# Patient Record
Sex: FEMALE | Race: WHITE | NOT HISPANIC OR LATINO | Employment: OTHER | URBAN - METROPOLITAN AREA
[De-identification: names, ages, dates, MRNs, and addresses within clinical notes are randomized per-mention and may not be internally consistent; named-entity substitution may affect disease eponyms.]

---

## 2017-05-05 ENCOUNTER — TRANSCRIBE ORDERS (OUTPATIENT)
Dept: ADMINISTRATIVE | Facility: HOSPITAL | Age: 82
End: 2017-05-05

## 2017-05-05 DIAGNOSIS — Z12.31 VISIT FOR SCREENING MAMMOGRAM: Primary | ICD-10-CM

## 2017-05-17 ENCOUNTER — HOSPITAL ENCOUNTER (OUTPATIENT)
Dept: MAMMOGRAPHY | Facility: HOSPITAL | Age: 82
Discharge: HOME | End: 2017-05-17
Admitting: FAMILY MEDICINE

## 2017-05-17 DIAGNOSIS — Z12.31 VISIT FOR SCREENING MAMMOGRAM: ICD-10-CM

## 2017-05-17 PROCEDURE — G0202 SCR MAMMO BI INCL CAD: HCPCS

## 2017-05-17 PROCEDURE — 77063 BREAST TOMOSYNTHESIS BI: CPT | Performed by: RADIOLOGY

## 2017-05-17 PROCEDURE — 77063 BREAST TOMOSYNTHESIS BI: CPT

## 2017-05-17 PROCEDURE — G0202 SCR MAMMO BI INCL CAD: HCPCS | Performed by: RADIOLOGY

## 2017-06-14 ENCOUNTER — OFFICE VISIT (OUTPATIENT)
Dept: FAMILY MEDICINE CLINIC | Facility: CLINIC | Age: 82
End: 2017-06-14

## 2017-06-14 VITALS
OXYGEN SATURATION: 96 % | HEIGHT: 62 IN | TEMPERATURE: 97.7 F | SYSTOLIC BLOOD PRESSURE: 140 MMHG | BODY MASS INDEX: 25.03 KG/M2 | HEART RATE: 72 BPM | DIASTOLIC BLOOD PRESSURE: 78 MMHG | WEIGHT: 136 LBS

## 2017-06-14 DIAGNOSIS — I80.9 SUPERFICIAL PHLEBITIS: Primary | ICD-10-CM

## 2017-06-14 PROCEDURE — 99213 OFFICE O/P EST LOW 20 MIN: CPT | Performed by: FAMILY MEDICINE

## 2017-06-14 NOTE — PROGRESS NOTES
Subjective   Laverne Cole is a 83 y.o. female.     History of Present Illness   83-year-old female.  Complaining of pain in the right lower extremity.  That is the anterior aspect of the leg in the lower half.  There is been slightly red.  No definite fever.  No definite trauma.  Skin has not been broken.  Normally goes to Dr. Michael Cortes.  The following portions of the patient's history were reviewed and updated as appropriate: allergies, current medications, past family history, past medical history, past social history, past surgical history and problem list.    Review of Systems   Constitutional: Negative for fever and unexpected weight change.   Respiratory: Negative for cough and shortness of breath.    Cardiovascular: Negative for leg swelling.   Musculoskeletal:        Leg pain       Objective   Physical Exam   Constitutional: She appears well-nourished.   HENT:   Head: Normocephalic.   Neurological: She is alert.   Skin:            Assessment/Plan   Laverne was seen today for leg pain.    Diagnoses and all orders for this visit:    Superficial phlebitis    Elevate leg.  May take some Aleve twice a day short-term that is 1-2 weeks.  Don't think other testing needs to be done.  Nothing to suggest a DVT.

## 2018-04-05 ENCOUNTER — TRANSCRIBE ORDERS (OUTPATIENT)
Dept: ADMINISTRATIVE | Facility: HOSPITAL | Age: 83
End: 2018-04-05

## 2018-04-05 DIAGNOSIS — Z12.31 VISIT FOR SCREENING MAMMOGRAM: Primary | ICD-10-CM

## 2018-05-18 ENCOUNTER — HOSPITAL ENCOUNTER (OUTPATIENT)
Dept: MAMMOGRAPHY | Facility: HOSPITAL | Age: 83
Discharge: HOME | End: 2018-05-18
Admitting: FAMILY MEDICINE

## 2018-05-18 DIAGNOSIS — Z12.31 VISIT FOR SCREENING MAMMOGRAM: ICD-10-CM

## 2018-05-18 PROCEDURE — 77063 BREAST TOMOSYNTHESIS BI: CPT

## 2018-05-18 PROCEDURE — 77067 SCR MAMMO BI INCL CAD: CPT

## 2018-05-18 PROCEDURE — 77063 BREAST TOMOSYNTHESIS BI: CPT | Performed by: RADIOLOGY

## 2018-05-18 PROCEDURE — 77067 SCR MAMMO BI INCL CAD: CPT | Performed by: RADIOLOGY

## 2019-04-05 ENCOUNTER — TRANSCRIBE ORDERS (OUTPATIENT)
Dept: ADMINISTRATIVE | Facility: HOSPITAL | Age: 84
End: 2019-04-05

## 2019-04-05 DIAGNOSIS — Z12.31 VISIT FOR SCREENING MAMMOGRAM: Primary | ICD-10-CM

## 2019-05-21 ENCOUNTER — HOSPITAL ENCOUNTER (OUTPATIENT)
Dept: MAMMOGRAPHY | Facility: HOSPITAL | Age: 84
Discharge: HOME | End: 2019-05-21
Admitting: FAMILY MEDICINE

## 2019-05-21 DIAGNOSIS — Z12.31 VISIT FOR SCREENING MAMMOGRAM: ICD-10-CM

## 2019-05-21 PROCEDURE — 77067 SCR MAMMO BI INCL CAD: CPT

## 2019-05-21 PROCEDURE — 77063 BREAST TOMOSYNTHESIS BI: CPT | Performed by: RADIOLOGY

## 2019-05-21 PROCEDURE — 77067 SCR MAMMO BI INCL CAD: CPT | Performed by: RADIOLOGY

## 2019-05-21 PROCEDURE — 77063 BREAST TOMOSYNTHESIS BI: CPT

## 2020-04-10 ENCOUNTER — TRANSCRIBE ORDERS (OUTPATIENT)
Dept: ADMINISTRATIVE | Facility: HOSPITAL | Age: 85
End: 2020-04-10

## 2020-04-10 DIAGNOSIS — Z12.31 VISIT FOR SCREENING MAMMOGRAM: Primary | ICD-10-CM

## 2020-07-24 ENCOUNTER — HOSPITAL ENCOUNTER (OUTPATIENT)
Dept: MAMMOGRAPHY | Facility: HOSPITAL | Age: 85
Discharge: HOME | End: 2020-07-24
Admitting: FAMILY MEDICINE

## 2020-07-24 DIAGNOSIS — Z12.31 VISIT FOR SCREENING MAMMOGRAM: ICD-10-CM

## 2020-07-24 PROCEDURE — 77063 BREAST TOMOSYNTHESIS BI: CPT

## 2020-07-24 PROCEDURE — 77067 SCR MAMMO BI INCL CAD: CPT | Performed by: RADIOLOGY

## 2020-07-24 PROCEDURE — 77067 SCR MAMMO BI INCL CAD: CPT

## 2020-07-24 PROCEDURE — 77063 BREAST TOMOSYNTHESIS BI: CPT | Performed by: RADIOLOGY

## 2021-06-18 ENCOUNTER — TRANSCRIBE ORDERS (OUTPATIENT)
Dept: ADMINISTRATIVE | Facility: HOSPITAL | Age: 86
End: 2021-06-18

## 2021-06-18 DIAGNOSIS — Z12.31 VISIT FOR SCREENING MAMMOGRAM: Primary | ICD-10-CM

## 2021-08-02 ENCOUNTER — APPOINTMENT (OUTPATIENT)
Dept: MAMMOGRAPHY | Facility: HOSPITAL | Age: 86
End: 2021-08-02

## 2021-08-26 ENCOUNTER — HOSPITAL ENCOUNTER (OUTPATIENT)
Dept: MAMMOGRAPHY | Facility: HOSPITAL | Age: 86
Discharge: HOME | End: 2021-08-26
Admitting: FAMILY MEDICINE

## 2021-08-26 DIAGNOSIS — Z12.31 VISIT FOR SCREENING MAMMOGRAM: ICD-10-CM

## 2021-08-26 PROCEDURE — 77063 BREAST TOMOSYNTHESIS BI: CPT

## 2021-08-26 PROCEDURE — 77063 BREAST TOMOSYNTHESIS BI: CPT | Performed by: RADIOLOGY

## 2021-08-26 PROCEDURE — 77067 SCR MAMMO BI INCL CAD: CPT | Performed by: RADIOLOGY

## 2021-08-26 PROCEDURE — 77067 SCR MAMMO BI INCL CAD: CPT

## 2022-03-03 ENCOUNTER — OFFICE VISIT (OUTPATIENT)
Dept: ORTHOPEDIC SURGERY | Facility: CLINIC | Age: 87
End: 2022-03-03

## 2022-03-03 VITALS
BODY MASS INDEX: 31.47 KG/M2 | DIASTOLIC BLOOD PRESSURE: 64 MMHG | WEIGHT: 136 LBS | HEIGHT: 55 IN | SYSTOLIC BLOOD PRESSURE: 108 MMHG

## 2022-03-03 DIAGNOSIS — M12.812 ROTATOR CUFF TEAR ARTHROPATHY OF BOTH SHOULDERS: Primary | ICD-10-CM

## 2022-03-03 DIAGNOSIS — M75.101 ROTATOR CUFF TEAR ARTHROPATHY OF BOTH SHOULDERS: Primary | ICD-10-CM

## 2022-03-03 DIAGNOSIS — M25.511 CHRONIC PAIN OF BOTH SHOULDERS: ICD-10-CM

## 2022-03-03 DIAGNOSIS — M12.811 ROTATOR CUFF TEAR ARTHROPATHY OF BOTH SHOULDERS: Primary | ICD-10-CM

## 2022-03-03 DIAGNOSIS — M25.512 CHRONIC PAIN OF BOTH SHOULDERS: ICD-10-CM

## 2022-03-03 DIAGNOSIS — M75.102 ROTATOR CUFF TEAR ARTHROPATHY OF BOTH SHOULDERS: Primary | ICD-10-CM

## 2022-03-03 DIAGNOSIS — M75.122 NONTRAUMATIC COMPLETE TEAR OF LEFT ROTATOR CUFF: ICD-10-CM

## 2022-03-03 DIAGNOSIS — M75.121 NONTRAUMATIC COMPLETE TEAR OF RIGHT ROTATOR CUFF: ICD-10-CM

## 2022-03-03 DIAGNOSIS — G89.29 CHRONIC PAIN OF BOTH SHOULDERS: ICD-10-CM

## 2022-03-03 PROCEDURE — 99204 OFFICE O/P NEW MOD 45 MIN: CPT | Performed by: ORTHOPAEDIC SURGERY

## 2022-03-03 RX ORDER — BLOOD SUGAR DIAGNOSTIC
STRIP MISCELLANEOUS
COMMUNITY
Start: 2022-01-15

## 2022-03-03 RX ORDER — GLYBURIDE 2.5 MG/1
TABLET ORAL
COMMUNITY

## 2022-03-03 RX ORDER — ATORVASTATIN CALCIUM 10 MG/1
TABLET, FILM COATED ORAL
COMMUNITY
End: 2022-09-01

## 2022-03-03 RX ORDER — MONTELUKAST SODIUM 10 MG/1
10 TABLET ORAL DAILY PRN
COMMUNITY
Start: 2021-12-01

## 2022-03-03 RX ORDER — MONTELUKAST SODIUM 10 MG/1
TABLET ORAL
COMMUNITY
End: 2022-03-03

## 2022-03-03 RX ORDER — LOSARTAN POTASSIUM 100 MG/1
100 TABLET ORAL NIGHTLY
COMMUNITY
Start: 2021-12-01

## 2022-03-03 RX ORDER — PANTOPRAZOLE SODIUM 40 MG/1
40 TABLET, DELAYED RELEASE ORAL DAILY
COMMUNITY
Start: 2022-01-11

## 2022-03-03 RX ORDER — DICYCLOMINE HYDROCHLORIDE 10 MG/1
CAPSULE ORAL
COMMUNITY

## 2022-03-03 RX ORDER — METOPROLOL SUCCINATE 25 MG/1
25 TABLET, EXTENDED RELEASE ORAL DAILY
COMMUNITY

## 2022-03-03 RX ORDER — CLOPIDOGREL BISULFATE 75 MG/1
TABLET ORAL DAILY
COMMUNITY
End: 2022-03-03

## 2022-03-03 RX ORDER — SODIUM POLYSTYRENE SULFONATE 4.1 MEQ/G
POWDER, FOR SUSPENSION ORAL; RECTAL
COMMUNITY
Start: 2022-02-04

## 2022-03-03 RX ORDER — CLOPIDOGREL BISULFATE 75 MG/1
75 TABLET ORAL DAILY
COMMUNITY
Start: 2022-01-22

## 2022-03-03 RX ORDER — LANCETS
EACH MISCELLANEOUS
COMMUNITY
Start: 2021-12-24

## 2022-03-03 NOTE — PROGRESS NOTES
[facility] Office Visit - Ron Lomax MD    Office Visit       Patient Name: Laverne Cole    Chief Complaint:   Chief Complaint   Patient presents with   • Right Shoulder - Pain   • Left Shoulder - Pain       Referring Physician: Zacarias Vaz MD -- I appreciate the referral    History of Present Illness:   Laverne Cole is a 88 y.o. female who presents with bilateral body part: shoulder Reason: pain.  Onset:Onset: atraumatic and gradual in nature. The issue has been ongoing for 8 year(s). Pain is a 5/10 on the pain scale. Pain is described as Pain Characterization: aching. Associated symptoms include Symptoms: pain, swelling, popping, grinding and stiffness. The pain is worse with lying on affected side; resting, ice and pain medication and/or NSAID improve the pain. Previous treatments have included: cane/walker and oral steroids. I have reviewed the patient's history of present illness as noted/entered above.    I have reviewed the patient's past medical history, surgical history, social history, family history, medications, and allergies as noted in the electronic medical record and as noted/entered.  I have reviewed the patient's review of systems as noted/enter and updated as noted in the patient's HPI.      Reviewed note from Dr. Vaz. 80ml aspirated from left shoulder and smaller amount from right  Shoulder pain RIGHT November in [place] in shower  Repeat event on the LEFT this past week in shower as well; pain medication helped    Severe bilateral rotator cuff tear arthropathy  Left sided Defibrillator    Patient and daughter acknowledge that not good surgical candidate based on medical comorbidities    Aspirations and injections in the past    [employer] worker    Very supportive daughter present    Niece: Reyna Thornton          Subjective   Subjective      Review of Systems   Constitutional: Negative.     HENT: Negative.    Eyes: Negative.    Respiratory: Negative.    Cardiovascular: Positive for palpitations.   Gastrointestinal: Negative.    Endocrine: Positive for heat intolerance.   Genitourinary: Negative.    Musculoskeletal: Positive for arthralgias, joint swelling and neck stiffness.   Skin: Negative.    Allergic/Immunologic: Negative.    Neurological: Negative.    Hematological: Negative.    Psychiatric/Behavioral: Negative.         Past Medical History:   Past Medical History:   Diagnosis Date   • Corns and callosities    • Cough    • Diabetes mellitus (HCC)    • Foot pain    • Hyperlipidemia    • Hypertension    • Low back pain    • Osteoporosis    • Peripheral venous insufficiency    • Pneumonia    • Toe pain        Past Surgical History:   Past Surgical History:   Procedure Laterality Date   • BACK SURGERY  05/2013    REDDING   • PACEMAKER IMPLANTATION     • TUBAL ABDOMINAL LIGATION         Family History:   Family History   Problem Relation Age of Onset   • Breast cancer Daughter 43   • Breast cancer Maternal Aunt         DX AGE UNKNOWN   • Breast cancer Maternal Aunt         DX AGE UNKNOWN   • Ovarian cancer Neg Hx        Social History:   Social History     Socioeconomic History   • Marital status:    Tobacco Use   • Smoking status: Never Smoker   Substance and Sexual Activity   • Alcohol use: No   • Drug use: No       Medications:   Current Outpatient Medications:   •  aspirin 81 MG EC tablet, Take 81 mg by mouth Daily., Disp: , Rfl:   •  atorvastatin (Lipitor) 10 MG tablet, Lipitor 10 mg tablet  Every night at bedtime, Disp: , Rfl:   •  Cholecalciferol (VITAMIN D3) 5000 UNITS capsule capsule, Take 5,000 Units by mouth Daily., Disp: , Rfl:   •  clopidogrel (PLAVIX) 75 MG tablet, Take 75 mg by mouth Daily., Disp: , Rfl:   •  cyclobenzaprine (FLEXERIL) 10 MG tablet, Take 10 mg by mouth 3 (Three) Times a Day As Needed for muscle spasms., Disp: , Rfl:   •  dicyclomine (Bentyl) 10 MG capsule, Bentyl  "10 mg capsule  Four times a day, Disp: , Rfl:   •  furosemide (LASIX) 20 MG tablet, Take 20 mg by mouth Daily., Disp: , Rfl:   •  glyburide (DIAbeta) 2.5 MG tablet, glyburide 2.5 mg tablet  Daily, Disp: , Rfl:   •  Lancets (onetouch ultrasoft) lancets, USE 1 CARTRIDGE NEEDLE ONCE A DAY, Disp: , Rfl:   •  losartan (COZAAR) 100 MG tablet, Take 100 mg by mouth Every Night., Disp: , Rfl:   •  metoprolol succinate XL (TOPROL-XL) 25 MG 24 hr tablet, Take 25 mg by mouth Daily., Disp: , Rfl:   •  montelukast (SINGULAIR) 10 MG tablet, Take 10 mg by mouth Daily As Needed., Disp: , Rfl:   •  OneTouch Ultra test strip, USE 1 STRIP ONCE A DAY, Disp: , Rfl:   •  pantoprazole (PROTONIX) 40 MG EC tablet, Take 40 mg by mouth Daily., Disp: , Rfl:   •  sAXagliptin HCl (ONGLYZA PO), Onglyza, Disp: , Rfl:   •  sodium polystyrene (KAYEXALATE) powder, , Disp: , Rfl:     Allergies: No Known Allergies    The following portions of the patient's history were reviewed and updated as appropriate: allergies, current medications, past family history, past medical history, past social history, past surgical history and problem list.        Objective    Objective      Vital Signs:   Vitals:    03/03/22 0953   BP: 108/64   Weight: 61.7 kg (136 lb)   Height: 62 cm (24.41\")       Ortho Exam:  General: no acute distress, comfortable  Vitals reviewed in chart    Musculoskeletal Exam    SIDE: RIGHT worse than left shoulder  Shoulder Exam    Tenderness:  GH joint pain  Known acromial insufficiency fractures    Range of motion measurements (degrees)  Forward flexion/Abduction/External rotation at side/ER at 90/IR at 90/IR position  Active: 45/45/10 bilateral  Severe crepitus  Passive: same    Painful arc of motion: yes  Glenohumeral joint contracture: yes  Glenohumeral joint crepitus: yes  Glenohumeral joint pain: yes  No evidence of septic joint  Impingement testing Neer's test - positive/painful  Impingement testing Hawkin's test - " positive/painful    Rotator Cuff Testing:  Tenderness to palpation at rotator cuff - yes  Rotator cuff testing Car's test - positive  Rotator cuff testing External rotation - positive  Rotator cuff testing Lag signs - positive  Rotator cuff testing Belly press - positive  Pain with abduction great than 90 degrees - yes  Rotator cuff testing limiting by glenohumeral joint pain and stiffness      Results Review:   Imaging Results (Last 24 Hours)     Procedure Component Value Units Date/Time    XR Shoulder 2+ View Bilateral [[id number]] Resulted: 03/03/22 1028     Updated: 03/03/22 1032    Narrative:      Imaging: BILATERAL shoulder x-rays 2 views - AP and axillary x-ray views    Side: BILATERAL     Indication for BILATERAL  shoulder x-ray 2 views: BILATERAL shoulder pain    Comparison: no comparison views available    BILATERAL SHOULDER Findings:   Bilateral severe rotator cuff tear arthropathy right worse than left.    Bilateral acromial insufficiency fractures.  Right glenoid severe wear and   medialization radiographic findings.    I personally reviewed the above x-rays and discussed with the patient.          Procedures           Assessment / Plan      Assessment/Plan:   Problem List Items Addressed This Visit        Musculoskeletal and Injuries    Rotator cuff tear arthropathy of both shoulders - Primary    Relevant Orders    Ambulatory Referral to Physical Therapy    Nontraumatic complete tear of right rotator cuff    Relevant Orders    Ambulatory Referral to Physical Therapy    Chronic pain of both shoulders    Relevant Orders    XR Shoulder 2+ View Bilateral (Completed)    Ambulatory Referral to Physical Therapy    Nontraumatic complete tear of left rotator cuff    Relevant Orders    Ambulatory Referral to Physical Therapy        Severe bilateral rotator cuff tear arthropathy  RIGHT worse than left radiographically  Counseled on conservative course she has an excellent protocol from Dr. Vaz with regards to  rotator cuff and deltoid programs.  She was interested in doing formal therapy so this was prescribed.  They know of Linh Cosme and we will get her plugged in-excellent physical therapist    I agree with conservative course in this complex scenario.  Severe rotator cuff tear arthropathy bilaterally right significantly worse than left.  The right does not look reconstructive on the glenoid side and history of defibrillator/not interested in pursuing surgical intervention which I think is very reasonable to stay with conservative course.    Follow Up: 6 months        Ron Lomax MD, FAAOS  Orthopedic Surgeon  Fellowship Trained Shoulder and Elbow Surgeon  [facility]  [address] [place] [address], [address]  [place] [address]    03/03/22  10:34 EST

## 2022-03-14 ENCOUNTER — TREATMENT (OUTPATIENT)
Dept: PHYSICAL THERAPY | Facility: CLINIC | Age: 87
End: 2022-03-14

## 2022-03-14 DIAGNOSIS — M12.811 ROTATOR CUFF TEAR ARTHROPATHY OF BOTH SHOULDERS: ICD-10-CM

## 2022-03-14 DIAGNOSIS — G89.29 CHRONIC PAIN OF BOTH SHOULDERS: Primary | ICD-10-CM

## 2022-03-14 DIAGNOSIS — M75.101 ROTATOR CUFF TEAR ARTHROPATHY OF BOTH SHOULDERS: ICD-10-CM

## 2022-03-14 DIAGNOSIS — M75.102 ROTATOR CUFF TEAR ARTHROPATHY OF BOTH SHOULDERS: ICD-10-CM

## 2022-03-14 DIAGNOSIS — M75.122 NONTRAUMATIC COMPLETE TEAR OF LEFT ROTATOR CUFF: ICD-10-CM

## 2022-03-14 DIAGNOSIS — M12.812 ROTATOR CUFF TEAR ARTHROPATHY OF BOTH SHOULDERS: ICD-10-CM

## 2022-03-14 DIAGNOSIS — M25.512 CHRONIC PAIN OF BOTH SHOULDERS: Primary | ICD-10-CM

## 2022-03-14 DIAGNOSIS — M25.511 CHRONIC PAIN OF BOTH SHOULDERS: Primary | ICD-10-CM

## 2022-03-14 DIAGNOSIS — M75.121 NONTRAUMATIC COMPLETE TEAR OF ROTATOR CUFF, RIGHT: ICD-10-CM

## 2022-03-14 PROCEDURE — 97162 PT EVAL MOD COMPLEX 30 MIN: CPT | Performed by: PHYSICAL THERAPIST

## 2022-03-14 PROCEDURE — 97110 THERAPEUTIC EXERCISES: CPT | Performed by: PHYSICAL THERAPIST

## 2022-03-14 NOTE — PROGRESS NOTES
Physical Therapy Initial Evaluation and Plan of Care    Patient: Laverne Cole   : 1933  Diagnosis/ICD-10 Code:  Chronic pain of both shoulders [M25.511, G89.29, M25.512]  Referring practitioner: Ron Lomax MD  Date of Initial Visit: 3/14/2022  Today's Date: 3/15/2022  Patient seen for 1 sessions         Visit Diagnoses:    ICD-10-CM ICD-9-CM   1. Chronic pain of both shoulders  M25.511 719.41    G89.29 338.29    M25.512    2. Rotator cuff tear arthropathy of both shoulders  M75.101 716.81    M12.811     M12.812     M75.102    3. Nontraumatic complete tear of rotator cuff, right  M75.121 727.61   4. Nontraumatic complete tear of left rotator cuff  M75.122 727.61       Subjective Questionnaire: QuickDASH: 50      Subjective Evaluation    History of Present Illness  Mechanism of injury: Pt states her right shoulder pain started in 2021, reports first episode of actual shoulder pain was when she was in the shower. States she noted significant pain that remained elevated, went to ED in [place] and got pain medication, which helped. States she f/u with Dr. Vaz when she returned from [place] in Dec, she was a previous Dr. Vaz patient in 2016 where she received injection and aspiration, f/u with him in [facility]. States she noted improvement with steroid pack and things got back to normal. States right shoulder pain repeated again recently. Almost went to the ER due to pain, states she noted increased pain with ice and heat both, improved with pain medication. States she was referred to Dr. oLmax, where she discussed option of PT, not a surgical candidate. States she has defibrillator on left side, recent heart attack 2021 with stent placed, cardiac rehab completed. R shoulder is worse than L shoulder, notes severe bilateral RC arthropathy. States she is unable to do her hair, requires assist from her daughter, sometimes has difficulty feeding herself with right arm due to limited motion and  shoulder pain. Pain and difficulty with reaching with both arms.     Subjective comment: Patient presents with c/o bilateral shoulder pain and limited shoulder mobility.   Patient Occupation: Retired, has supportive daughter present during evaluation, Alyson, with whom she lives. , spouse requires some assistance, indep with showering washing herself, needs help with wash hair. Uses a QB (leg bike) at home for about 30 minutes. SPC in RUE.  Pain  Current pain rating: 3  At best pain ratin  At worst pain ratin  Quality: dull ache, discomfort and sharp  Relieving factors: change in position  Aggravating factors: overhead activity, prolonged positioning, lifting, movement and repetitive movement  Progression: no change    Social Support  Lives in: multiple-level home  Lives with: spouse and adult children    Hand dominance: right    Diagnostic Tests  X-ray: abnormal    Treatments  Previous treatment: injection treatment  Patient Goals  Patient goals for therapy: decreased pain, increased motion, increased strength and independence with ADLs/IADLs             Objective          Postural Observations  Seated posture: fair    Additional Postural Observation Details  Increased thoracic kyphosis, rounded shoulders.     Tenderness     Additional Tenderness Details  Denies TTP along bilateral shoulders.     Cervical/Thoracic Screen   Cervical range of motion within normal limits with the following exceptions: Denies reproduction of pain with cervical mobility. Taut cervical rotation bilateral, denies pain.   Thoracic range of motion within normal limits with the following exceptions: Limited thoracic extension; maintains mild forward flexed posture.    Neurological Testing     Sensation     Shoulder   Left Shoulder   Intact: light touch    Right Shoulder   Intact: light touch    Active Range of Motion   Left Shoulder   Flexion: 58 degrees with pain  Abduction: 29 degrees with pain  External rotation 0°: 25  degrees     Right Shoulder   Flexion: 20 degrees with pain  Abduction: 20 degrees with pain  External rotation 0°: 12 degrees     Additional Active Range of Motion Details  Unable to perform ER behind head, lack of motion/strength  IR bilateral attempted, able to touch iliac crest laterally    Passive Range of Motion   Left Shoulder   Flexion: 90 degrees   Abduction: 90 degrees   External rotation 0°: 30 degrees     Right Shoulder   Flexion: 90 degrees   Abduction: 80 degrees   External rotation 0°: 30 degrees     Strength/Myotome Testing     Left Shoulder     Planes of Motion   Flexion: 3-   Abduction: 3-   External rotation at 0°: 3-   Internal rotation at 0°: 3+     Isolated Muscles   Biceps: 4-   Upper trapezius: 4     Right Shoulder     Planes of Motion   Flexion: 3-   Abduction: 3-   External rotation at 0°: 3-   Internal rotation at 0°: 3     Isolated Muscles   Biceps: 4-   Upper trapezius: 4     Tests     Additional Tests Details  Compensated UE elevation- significant [place] hiking            Assessment & Plan     Assessment  Impairments: abnormal or restricted ROM, activity intolerance, impaired physical strength, lacks appropriate home exercise program and pain with function  Functional Limitations: carrying objects, lifting, pulling, pushing, uncomfortable because of pain, moving in bed, reaching behind back, reaching overhead and unable to perform repetitive tasks  Assessment details: Pt is an 89 yo female referred to PT for chronic bilateral shoulder pain who demonstrates signs and symptoms consistent with advanced RC arthropathy. Pt recently had consult with Dr. Lomax, referred to PT for bilateral nontraumatic complete tear of RC. She demonstrates significant shoulder AROM limitation, weakness of BUE, poor scapular retraction, and reports decline from her functional baseline, requiring assistance with ADLs. R shoulder is worse than L shoulder, pain kidd. Recommend skilled PT to improve shoulder mobility,  maintain elbow and wrist mobility and strength to increase activity tolerance and improve independence.     Barriers to therapy: advanced RC arthropathy  Prognosis: fair    Goals  Plan Goals: Short Term Goals (3-4 wks)  1. Patient to report bilateral shoulder pain less than or equal to 4/10.  2. Patient to demonstrate at least 100 degrees of AROM right shoulder flexion.  3. Patient will be independent and compliant with initial home exercise program.   4. Pt will report resting pain 0-1/10, 2-3/10 with PROM.  5. Pt will demonstrate 10-30° improvements in PFE, PER, PIR, and PABD.    Long Term Goals (6-8 wks)  1. Patient to demonstrate at least 110 degrees of bilateral shoulder flexion AROM.  2. Patient to demonstrate at least 100 degrees of bilateral shoulder abduction AROM.  3. Pt. will be independent and compliant with advanced home exercise program to facilitate self-management of symptoms.  4. Patient will improve Quick Dash score by 15 points to demonstrate improved function of daily tasks.      Plan  Therapy options: will be seen for skilled therapy services  Planned modality interventions: cryotherapy, dry needling and thermotherapy (hydrocollator packs)  Planned therapy interventions: body mechanics training, functional ROM exercises, home exercise program, joint mobilization, manual therapy, neuromuscular re-education, postural training, soft tissue mobilization, strengthening, stretching and therapeutic activities  Frequency: 1x week  Duration in visits: 6  Duration in weeks: 8  Treatment plan discussed with: patient and caregiver  Plan details: Assess response from initial HEP. Progress mobility exercises for bilateral shoulders, scapular stabilization, elbow ROM, hand ROM and gripping exercises to improve functional independence with ADLs.         History # of Personal factors and/or comorbidities: MODERATE (1-2)  Examination of Body System(s): # of elements: MODERATE (3)  Clinical Presentation:  EVOLVING  Clinical Decision Making: MODERATE      Timed:  Manual Therapy:    0     mins  36507;  Therapeutic Exercise:    12     mins  44985;     Neuromuscular Lata:    0    mins  91127;    Therapeutic Activity:     0     mins  64533;     Gait Trainin     mins  92639;     Ultrasound:     0     mins  83924;    Ionto   __0_  mins  41405;    Un-Timed:  Electrical Stimulation:    0     mins  07764 ( );  Dry Needling     0     mins self-pay  Traction  _ 0_   mins  15983  Low Eval  __0_ mins  39949  Mod Eval  _42__ mins  62164  High Eval  _0__ mins   38632    Timed Treatment:   12   mins   Total Treatment:     54   mins    PT SIGNATURE: Corinne E. Perkins, PT   [place] License: [id number]      Certification Period: 3/15/2022 thru 2022  I certify that the therapy services are furnished while this patient is under my care.  The services outlined above are required by this patient, and will be reviewed every 90 days.        Physician Signature: _______________________________________________________________________________________________________     PHYSICIAN: Ron Lomax MD   NPI: [id number]     DATE:                                             Please sign and return via fax to .[employer] . Thank you, [facility].

## 2022-03-23 ENCOUNTER — TREATMENT (OUTPATIENT)
Dept: PHYSICAL THERAPY | Facility: CLINIC | Age: 87
End: 2022-03-23

## 2022-03-23 DIAGNOSIS — M25.512 CHRONIC PAIN OF BOTH SHOULDERS: Primary | ICD-10-CM

## 2022-03-23 DIAGNOSIS — M12.811 ROTATOR CUFF TEAR ARTHROPATHY OF BOTH SHOULDERS: ICD-10-CM

## 2022-03-23 DIAGNOSIS — M12.812 ROTATOR CUFF TEAR ARTHROPATHY OF BOTH SHOULDERS: ICD-10-CM

## 2022-03-23 DIAGNOSIS — M75.102 ROTATOR CUFF TEAR ARTHROPATHY OF BOTH SHOULDERS: ICD-10-CM

## 2022-03-23 DIAGNOSIS — M75.121 NONTRAUMATIC COMPLETE TEAR OF ROTATOR CUFF, RIGHT: ICD-10-CM

## 2022-03-23 DIAGNOSIS — G89.29 CHRONIC PAIN OF BOTH SHOULDERS: Primary | ICD-10-CM

## 2022-03-23 DIAGNOSIS — M25.511 CHRONIC PAIN OF BOTH SHOULDERS: Primary | ICD-10-CM

## 2022-03-23 DIAGNOSIS — M75.122 NONTRAUMATIC COMPLETE TEAR OF LEFT ROTATOR CUFF: ICD-10-CM

## 2022-03-23 DIAGNOSIS — M75.101 ROTATOR CUFF TEAR ARTHROPATHY OF BOTH SHOULDERS: ICD-10-CM

## 2022-03-23 PROCEDURE — 97110 THERAPEUTIC EXERCISES: CPT | Performed by: PHYSICAL THERAPIST

## 2022-03-23 PROCEDURE — 97530 THERAPEUTIC ACTIVITIES: CPT | Performed by: PHYSICAL THERAPIST

## 2022-03-23 NOTE — PROGRESS NOTES
"Physical Therapy Daily Treatment Note      Patient: Laverne Cole   : 1933  Referring practitioner: Ron Lomax MD  Date of Initial Visit: Type: THERAPY  Noted: 3/14/2022  Today's Date: 3/23/2022  Patient seen for 2 sessions       Visit Diagnoses:    ICD-10-CM ICD-9-CM   1. Chronic pain of both shoulders  M25.511 719.41    G89.29 338.29    M25.512    2. Rotator cuff tear arthropathy of both shoulders  M75.101 716.81    M12.811     M12.812     M75.102    3. Nontraumatic complete tear of rotator cuff, right  M75.121 727.61   4. Nontraumatic complete tear of left rotator cuff  M75.122 727.61       Subjective   Patient reports she has been doing her shoulder stretches and feels good on arrival. States she did fall off a chair forward, required assistance x 2 to stand. States cut her right elbow, presents with dressing over elbow. States  her pain level is 0/10 upon arrival.      Objective   See Exercise, Manual, and Modality Logs for complete treatment.       Assessment/Plan   Patient tolerated progression of shoulder AAROM exercises standing and supine. Mild \"stretch\" reported at end range supine ER AAROM with audible crepitus noted. Pt educated on HEP, given written copy for home along with yellow foam for gripping exercises. Assess response from exercises and compliance with HEP. Could trial standing short lever arm flexion wall slides next visit along with RC isometrics, pending symptoms. Add weighted supination/pronation next visit as well.       Timed:         Manual Therapy:    0     mins  12992;     Therapeutic Exercise:    40     mins  32380;     Neuromuscular Lata:    0    mins  85190;    Therapeutic Activity:     13     mins  52039;     Gait Trainin     mins  85365;     Ultrasound:     0     mins  12000;    Ionto                               0    mins   10934  Self Care                       0     mins   12369  Canalith Repos    0     mins 47787      Un-Timed:  Electrical " Stimulation:    0     mins  68803 ( );  Dry Needling     0     mins self-pay  Traction     0     mins 75628      Timed Treatment:   53   mins   Total Treatment:     55   mins    Corinne E. Perkins, PT  [place] License: [id number]

## 2022-03-23 NOTE — PATIENT INSTRUCTIONS
Access Code: [email or web address]  URL: [email or web address]  Date: 03/23/2022  Prepared by: Corinne Perkins    Exercises  Supine Scapular Retraction - 1 x daily - 5 x weekly - 1 sets - 10 reps  Supine Shoulder External Rotation in 45 Degrees Abduction AAROM with Dowel - 1 x daily - 5 x weekly - 1 sets - 8-10 reps  Seated Bilateral Shoulder Flexion Towel Slide at Table Top - 1 x daily - 5 x weekly - 1 sets - 10 reps  Standing Lumbar Spine Flexion Stretch Counter - 1 x daily - 5 x weekly - 1 sets - 5-10 reps  Putty Squeezes - 1 x daily - 5 x weekly - 1-2 sets - 10 reps  Gripping Sponge Neutral - 1 x daily - 5 x weekly - 1-2 sets - 10 reps  Key Pinch with Putty - 1 x daily - 5 x weekly - 1-2 sets - 10 reps

## 2022-03-31 ENCOUNTER — TREATMENT (OUTPATIENT)
Dept: PHYSICAL THERAPY | Facility: CLINIC | Age: 87
End: 2022-03-31

## 2022-03-31 DIAGNOSIS — M25.511 CHRONIC PAIN OF BOTH SHOULDERS: Primary | ICD-10-CM

## 2022-03-31 DIAGNOSIS — M75.102 ROTATOR CUFF TEAR ARTHROPATHY OF BOTH SHOULDERS: ICD-10-CM

## 2022-03-31 DIAGNOSIS — M25.512 CHRONIC PAIN OF BOTH SHOULDERS: Primary | ICD-10-CM

## 2022-03-31 DIAGNOSIS — M12.811 ROTATOR CUFF TEAR ARTHROPATHY OF BOTH SHOULDERS: ICD-10-CM

## 2022-03-31 DIAGNOSIS — M75.122 NONTRAUMATIC COMPLETE TEAR OF LEFT ROTATOR CUFF: ICD-10-CM

## 2022-03-31 DIAGNOSIS — M75.121 NONTRAUMATIC COMPLETE TEAR OF ROTATOR CUFF, RIGHT: ICD-10-CM

## 2022-03-31 DIAGNOSIS — M75.101 ROTATOR CUFF TEAR ARTHROPATHY OF BOTH SHOULDERS: ICD-10-CM

## 2022-03-31 DIAGNOSIS — M12.812 ROTATOR CUFF TEAR ARTHROPATHY OF BOTH SHOULDERS: ICD-10-CM

## 2022-03-31 DIAGNOSIS — G89.29 CHRONIC PAIN OF BOTH SHOULDERS: Primary | ICD-10-CM

## 2022-03-31 PROCEDURE — 97110 THERAPEUTIC EXERCISES: CPT | Performed by: PHYSICAL THERAPIST

## 2022-03-31 PROCEDURE — 97140 MANUAL THERAPY 1/> REGIONS: CPT | Performed by: PHYSICAL THERAPIST

## 2022-03-31 NOTE — PROGRESS NOTES
Physical Therapy Daily Treatment Note      Patient: Laverne Cole   : 1933  Referring practitioner: Ron Lomax MD  Date of Initial Visit: Type: THERAPY  Noted: 3/14/2022  Today's Date: 3/31/2022  Patient seen for 3 sessions       Visit Diagnoses:    ICD-10-CM ICD-9-CM   1. Chronic pain of both shoulders  M25.511 719.41    G89.29 338.29    M25.512    2. Rotator cuff tear arthropathy of both shoulders  M75.101 716.81    M12.811     M12.812     M75.102    3. Nontraumatic complete tear of rotator cuff, right  M75.121 727.61   4. Nontraumatic complete tear of left rotator cuff  M75.122 727.61       Subjective   Patient reports left shoulder is worse than her right shoulder mobility wise, audible crepitus wise. States  her pain level is 0/10 upon arrival.  States she has noticed improved flexibility and less stiffness in shoulders first thing in the morning.     Objective   See Exercise, Manual, and Modality Logs for complete treatment.       Assessment/Plan   Improved right PROM shoulder abduction noted today, able to achieve 90 degrees prior to mild pain and crepitus. Attempted short lever arm AAROM standing at wall for slides and also from seated position; however due to weakness, unable to lift. She is compliant with current HEP, progressing towards goals as expected. Pending symptoms next visit, could consider standing ER/IR/abd isometrics, deltoid isometrics. Educated patient on STM techniques for UT and pec to decrease anterior HH position and forward shoulder.         Timed:         Manual Therapy:    23     mins  44925;     Therapeutic Exercise:    20     mins  70535;     Neuromuscular Lata:    0    mins  44068;    Therapeutic Activity:     0     mins  19642;     Gait Trainin     mins  74917;     Ultrasound:     0     mins  97619;    Ionto                               0    mins   85638  Self Care                       0     mins   97598  [facility]    0     mins  60728      Un-Timed:  Electrical Stimulation:    0     mins  47519 ( );  Dry Needling     0     mins self-pay  Traction     0     mins 39214      Timed Treatment:   43   mins   Total Treatment:     46   mins    Corinne E. Perkins, PT  [place] License: [id number]

## 2022-04-06 ENCOUNTER — TREATMENT (OUTPATIENT)
Dept: PHYSICAL THERAPY | Facility: CLINIC | Age: 87
End: 2022-04-06

## 2022-04-06 DIAGNOSIS — M25.512 CHRONIC PAIN OF BOTH SHOULDERS: Primary | ICD-10-CM

## 2022-04-06 DIAGNOSIS — M75.122 NONTRAUMATIC COMPLETE TEAR OF LEFT ROTATOR CUFF: ICD-10-CM

## 2022-04-06 DIAGNOSIS — M75.101 ROTATOR CUFF TEAR ARTHROPATHY OF BOTH SHOULDERS: ICD-10-CM

## 2022-04-06 DIAGNOSIS — M12.811 ROTATOR CUFF TEAR ARTHROPATHY OF BOTH SHOULDERS: ICD-10-CM

## 2022-04-06 DIAGNOSIS — M25.511 CHRONIC PAIN OF BOTH SHOULDERS: Primary | ICD-10-CM

## 2022-04-06 DIAGNOSIS — M12.812 ROTATOR CUFF TEAR ARTHROPATHY OF BOTH SHOULDERS: ICD-10-CM

## 2022-04-06 DIAGNOSIS — M75.102 ROTATOR CUFF TEAR ARTHROPATHY OF BOTH SHOULDERS: ICD-10-CM

## 2022-04-06 DIAGNOSIS — M75.121 NONTRAUMATIC COMPLETE TEAR OF ROTATOR CUFF, RIGHT: ICD-10-CM

## 2022-04-06 DIAGNOSIS — G89.29 CHRONIC PAIN OF BOTH SHOULDERS: Primary | ICD-10-CM

## 2022-04-06 PROCEDURE — 97110 THERAPEUTIC EXERCISES: CPT | Performed by: PHYSICAL THERAPIST

## 2022-04-06 PROCEDURE — 97530 THERAPEUTIC ACTIVITIES: CPT | Performed by: PHYSICAL THERAPIST

## 2022-04-06 NOTE — PROGRESS NOTES
"Physical Therapy Daily Treatment Note      Patient: Laverne Cole   : 1933  Referring practitioner: Ron Lomax MD  Date of Initial Visit: Type: THERAPY  Noted: 3/14/2022  Today's Date: 2022  Patient seen for 4 sessions       Visit Diagnoses:    ICD-10-CM ICD-9-CM   1. Chronic pain of both shoulders  M25.511 719.41    G89.29 338.29    M25.512    2. Rotator cuff tear arthropathy of both shoulders  M75.101 716.81    M12.811     M12.812     M75.102    3. Nontraumatic complete tear of rotator cuff, right  M75.121 727.61   4. Nontraumatic complete tear of left rotator cuff  M75.122 727.61       Subjective   Patient reports her shoulder pain seems less than usual in the mornings, states she isn't as tight in both shoulders. States she has been doing her exercises but noticed increased left shoulder pain with attempted AAROM ER. States  her pain level is 1/10 upon arrival, \"mild.\"     Objective   See Exercise, Manual, and Modality Logs for complete treatment.       Assessment/Plan   Reviewed current HEP for improved BUE mobility in pain free range and added ER isometrics to BUE. She demonstrates understanding in clinic, denies pain with isometrics. Attempted deltoid isometrics in clinic as well; however required significant cues for correct technique to avoid pain and compensations. Added ER isometrics to HEP in place of AAROM ER. Reassessment next visit. Could consider holding further PT after next visit, pending symptoms, with increased emphasis on progressed HEP.       Timed:         Manual Therapy:    0     mins  80188;     Therapeutic Exercise:    31     mins  10303;     Neuromuscular Lata:    0    mins  26752;    Therapeutic Activity:     10     mins  73674;     Gait Trainin     mins  72053;     Ultrasound:     0     mins  82305;    Ionto                               0    mins   96599  Self Care                       0     mins   45110  [facility] Repos    0     mins " 15966      Un-Timed:  Electrical Stimulation:    0     mins  76384 ( );  Dry Needling     0     mins self-pay  Traction     0     mins 95423      Timed Treatment:   41   mins   Total Treatment:     50   mins    Corinne E. Perkins, PT  [place] License: [id number]

## 2022-04-06 NOTE — PATIENT INSTRUCTIONS
Access Code: [email or web address]  URL: [email or web address]  Date: 04/06/2022  Prepared by: Corinne Perkins    Exercises  Supine Scapular Retraction - 1 x daily - 5 x weekly - 1 sets - 10 reps  Supine Shoulder External Rotation in 45 Degrees Abduction AAROM with Dowel - 1 x daily - 5 x weekly - 1 sets - 8-10 reps  Seated Bilateral Shoulder Flexion Towel Slide at Table Top - 1 x daily - 5 x weekly - 1 sets - 10 reps  Standing Lumbar Spine Flexion Stretch Counter - 1 x daily - 5 x weekly - 1 sets - 5-10 reps  Putty Squeezes - 1 x daily - 5 x weekly - 1-2 sets - 10 reps  Gripping Sponge Neutral - 1 x daily - 5 x weekly - 1-2 sets - 10 reps  Key Pinch with Putty - 1 x daily - 5 x weekly - 1-2 sets - 10 reps  Isometric Shoulder External Rotation - 1 x daily - 7 x weekly - 1 sets - 10 reps

## 2022-04-13 ENCOUNTER — TREATMENT (OUTPATIENT)
Dept: PHYSICAL THERAPY | Facility: CLINIC | Age: 87
End: 2022-04-13

## 2022-04-13 DIAGNOSIS — M12.811 ROTATOR CUFF TEAR ARTHROPATHY OF BOTH SHOULDERS: ICD-10-CM

## 2022-04-13 DIAGNOSIS — M25.511 CHRONIC PAIN OF BOTH SHOULDERS: Primary | ICD-10-CM

## 2022-04-13 DIAGNOSIS — M25.512 CHRONIC PAIN OF BOTH SHOULDERS: Primary | ICD-10-CM

## 2022-04-13 DIAGNOSIS — M12.812 ROTATOR CUFF TEAR ARTHROPATHY OF BOTH SHOULDERS: ICD-10-CM

## 2022-04-13 DIAGNOSIS — M75.121 NONTRAUMATIC COMPLETE TEAR OF ROTATOR CUFF, RIGHT: ICD-10-CM

## 2022-04-13 DIAGNOSIS — M75.122 NONTRAUMATIC COMPLETE TEAR OF LEFT ROTATOR CUFF: ICD-10-CM

## 2022-04-13 DIAGNOSIS — M75.102 ROTATOR CUFF TEAR ARTHROPATHY OF BOTH SHOULDERS: ICD-10-CM

## 2022-04-13 DIAGNOSIS — M75.101 ROTATOR CUFF TEAR ARTHROPATHY OF BOTH SHOULDERS: ICD-10-CM

## 2022-04-13 DIAGNOSIS — G89.29 CHRONIC PAIN OF BOTH SHOULDERS: Primary | ICD-10-CM

## 2022-04-13 PROCEDURE — 97110 THERAPEUTIC EXERCISES: CPT | Performed by: PHYSICAL THERAPIST

## 2022-04-13 NOTE — PROGRESS NOTES
Physical Therapy Re Certification Of Plan of Care and Discharge Note  Patient: Laverne Cole   : 1933  Diagnosis/ICD-10 Code:  Chronic pain of both shoulders [M25.511, G89.29, M25.512]  Referring practitioner: Ron Lomax MD  Date of Initial Visit: 2022  Today's Date: 2022  Patient seen for 5 sessions         Visit Diagnoses:    ICD-10-CM ICD-9-CM   1. Chronic pain of both shoulders  M25.511 719.41    G89.29 338.29    M25.512    2. Rotator cuff tear arthropathy of both shoulders  M75.101 716.81    M12.811     M12.812     M75.102    3. Nontraumatic complete tear of rotator cuff, right  M75.121 727.61   4. Nontraumatic complete tear of left rotator cuff  M75.122 727.61           Subjective Questionnaire: QuickDASH: 40.9  Clinical Progress: improved  Home Program Compliance: Yes  Treatment has included: therapeutic exercise, neuromuscular re-education, manual therapy and therapeutic activity      Subjective   Laverne Cole reports: she is pleased with her shoulder progress. States she is having less pain in the mornings and has modified some of her daily tasks to make her more independent in kitchen.     Objective          Postural Observations  Seated posture: fair    Additional Postural Observation Details  Increased thoracic kyphosis, rounded shoulders.     Tenderness     Additional Tenderness Details  Denies TTP along bilateral shoulders.     Cervical/Thoracic Screen   Cervical range of motion within normal limits with the following exceptions: Denies reproduction of pain with cervical mobility. Taut cervical rotation bilateral, denies pain.   Thoracic range of motion within normal limits with the following exceptions: Limited thoracic extension; maintains mild forward flexed posture.    Neurological Testing     Sensation     Shoulder   Left Shoulder   Intact: light touch    Right Shoulder   Intact: light touch    Active Range of Motion   Left Shoulder   Flexion: 60 degrees with  pain  Abduction: 39 degrees   External rotation 0°: 62 degrees     Right Shoulder   Flexion: 29 degrees with pain  Abduction: 32 degrees with pain  External rotation 0°: 12 degrees     Additional Active Range of Motion Details  Unable to perform ER behind head, lack of motion/strength  IR bilateral attempted, able to touch iliac crest laterally    Passive Range of Motion   Left Shoulder   Flexion: 90 degrees   Abduction: 90 degrees   External rotation 0°: 30 degrees     Right Shoulder   Flexion: 90 degrees   Abduction: 80 degrees   External rotation 0°: 30 degrees     Strength/Myotome Testing     Left Shoulder     Planes of Motion   Flexion: 3-   Abduction: 3-   External rotation at 0°: 3-   Internal rotation at 0°: 3+     Isolated Muscles   Biceps: 4-   Upper trapezius: 4     Right Shoulder     Planes of Motion   Flexion: 3-   Abduction: 3-   External rotation at 0°: 3-   Internal rotation at 0°: 3     Isolated Muscles   Biceps: 4-   Upper trapezius: 4     Tests     Additional Tests Details  Compensated UE elevation- significant UT hiking            Assessment & Plan     Assessment  Impairments: abnormal or restricted ROM, activity intolerance, impaired physical strength and pain with function  Functional Limitations: carrying objects, lifting, pulling, pushing, uncomfortable because of pain, moving in bed, reaching behind back, reaching overhead and unable to perform repetitive tasks  Assessment details: Reassessment completed today in clinic for 87 yo female referred to PT for chronic bilateral shoulder pain who demonstrates signs and symptoms consistent with advanced RC arthropathy. Pt recently had consult with Dr. Lomax, referred to PT for bilateral nontraumatic complete tear of RC. She demonstrates mild improvements in bilateral AROM; however continues to have significant shoulder AROM limitation, weakness of BUE, and poor scapular retraction. She reports less pain overall in both shoulders, especially in the  mornings. She is independent with current HEP and activity modifications to maintain  strength, wrist and elbow mobility, shoulder mobility, and cervical mobility. Reviewed current HEP and options for home. Also correctly fitted her SPC to decrease shoulder pain with ambulation and improve stability.     Barriers to therapy: advanced RC arthropathy  Prognosis: fair    Goals  Plan Goals: Short Term Goals (3-4 wks)  1. Patient to report bilateral shoulder pain less than or equal to 4/10. MET  2. Patient to demonstrate at least 100 degrees of AROM right shoulder flexion. MET  3. Patient will be independent and compliant with initial home exercise program.  MET  4. Pt will report resting pain 0-1/10, 2-3/10 with PROM. MET  5. Pt will demonstrate 10-30° improvements in PFE, PER, PIR, and PABD. MET    Long Term Goals (6-8 wks)  1. Patient to demonstrate at least 110 degrees of bilateral shoulder flexion AROM. NOT MET  2. Patient to demonstrate at least 100 degrees of bilateral shoulder abduction AROM. NOT MET  3. Pt. will be independent and compliant with advanced home exercise program to facilitate self-management of symptoms. MET  4. Patient will improve Quick Dash score by 15 points to demonstrate improved function of daily tasks. NOT MET      Plan  Treatment plan discussed with: patient and caregiver  Plan details: D/c from OPPT to home with current HEP. Pt agreeable to POC, verbalized understanding of home exercises and desire to continue independently.            Recommendations: Discharge    Timed:         Manual Therapy:    0     mins  99443;     Therapeutic Exercise:    39     mins  60430;     Neuromuscular Lata:    0    mins  73657;    Therapeutic Activity:     0     mins  34390;     Gait Trainin     mins  31262;     Ultrasound:     0     mins  77655;    Ionto                               0    mins   30864  Self Care                       0     mins   35742  Canalith Repos    0     mins  87347      Un-Timed:  Electrical Stimulation:    0     mins  52572 ( );  Dry Needling     0     mins self-pay  Traction     0     mins 46676  Re-Eval                           0    mins  68469      Timed Treatment:   39   mins   Total Treatment:     39   mins          PT: Corinne E. Perkins, PT     [place] License:  [id number]    Electronically signed by Corinne E. Perkins, PT, 04/13/22, 11:17 AM EDT    Certification Period: 4/14/2022 thru 7/12/2022  I certify that the therapy services are furnished while this patient is under my care.  The services outlined above are required by this patient, and will be reviewed every 90 days.         Physician Signature:__________________________________________________    PHYSICIAN: Ron Lomax MD   NPI: [id number]     DATE:     Please sign and return via fax to [email or web address] . Thank you, [facility]

## 2022-08-19 ENCOUNTER — TRANSCRIBE ORDERS (OUTPATIENT)
Dept: ADMINISTRATIVE | Facility: HOSPITAL | Age: 87
End: 2022-08-19

## 2022-08-19 DIAGNOSIS — Z12.31 VISIT FOR SCREENING MAMMOGRAM: Primary | ICD-10-CM

## 2022-09-01 ENCOUNTER — OFFICE VISIT (OUTPATIENT)
Dept: ORTHOPEDIC SURGERY | Facility: CLINIC | Age: 87
End: 2022-09-01

## 2022-09-01 VITALS
BODY MASS INDEX: 25.03 KG/M2 | WEIGHT: 136.02 LBS | HEIGHT: 62 IN | DIASTOLIC BLOOD PRESSURE: 76 MMHG | SYSTOLIC BLOOD PRESSURE: 114 MMHG

## 2022-09-01 DIAGNOSIS — G89.29 CHRONIC PAIN OF BOTH SHOULDERS: ICD-10-CM

## 2022-09-01 DIAGNOSIS — M25.511 CHRONIC PAIN OF BOTH SHOULDERS: ICD-10-CM

## 2022-09-01 DIAGNOSIS — M12.811 ROTATOR CUFF TEAR ARTHROPATHY OF BOTH SHOULDERS: Primary | ICD-10-CM

## 2022-09-01 DIAGNOSIS — M75.102 ROTATOR CUFF TEAR ARTHROPATHY OF BOTH SHOULDERS: Primary | ICD-10-CM

## 2022-09-01 DIAGNOSIS — M25.512 CHRONIC PAIN OF BOTH SHOULDERS: ICD-10-CM

## 2022-09-01 DIAGNOSIS — M75.121 NONTRAUMATIC COMPLETE TEAR OF RIGHT ROTATOR CUFF: ICD-10-CM

## 2022-09-01 DIAGNOSIS — M75.101 ROTATOR CUFF TEAR ARTHROPATHY OF BOTH SHOULDERS: Primary | ICD-10-CM

## 2022-09-01 DIAGNOSIS — M75.122 NONTRAUMATIC COMPLETE TEAR OF LEFT ROTATOR CUFF: ICD-10-CM

## 2022-09-01 DIAGNOSIS — M12.812 ROTATOR CUFF TEAR ARTHROPATHY OF BOTH SHOULDERS: Primary | ICD-10-CM

## 2022-09-01 PROCEDURE — 99212 OFFICE O/P EST SF 10 MIN: CPT | Performed by: ORTHOPAEDIC SURGERY

## 2022-09-01 RX ORDER — SITAGLIPTIN 100 MG/1
100 TABLET, FILM COATED ORAL DAILY
COMMUNITY
Start: 2022-08-08

## 2022-09-01 RX ORDER — GLIPIZIDE 5 MG/1
10 TABLET ORAL 2 TIMES DAILY
COMMUNITY
Start: 2022-08-26

## 2022-09-01 RX ORDER — ATORVASTATIN CALCIUM 40 MG/1
TABLET, FILM COATED ORAL
COMMUNITY
Start: 2022-06-10

## 2022-09-01 NOTE — PROGRESS NOTES
[facility] [place] Up       Office Follow Up Visit       Patient Name: Laverne Cole    Chief Complaint:   Chief Complaint   Patient presents with   • Follow-up     6 months- Rotator cuff tear arthropathy of both shoulders        Referring Physician: No ref. provider found    History of Present Illness:   It has been 6  month(s) since Laverne Cole's last visit. Laverne Cole returns to clinic today for F/U: follow-up of bilateralBody Part: shoulderReason: pain. The issue has been ongoing for 8 year(s). Laverne Cole rates HIS/HER: herpain at 0/10 on the pain scale. Previous/current treatments: cane/walker and physical therapy. Current symptoms:Symptoms: swelling, grinding and stiffness. The pain is worse with lying on affected side and any movement of the joint; resting improves the pain. Overall, he/she: sheis doing better.  I have reviewed the patient's history of present illness as noted/entered above.     I have reviewed the patient's past medical history, surgical history, social history, family history, medications, and allergies as noted in the electronic medical record and as noted/entered.  I have reviewed the patient's review of systems as noted/enter and updated as noted in the patient's HPI.        Reviewed note from Dr. Vaz. 80ml aspirated from left shoulder and smaller amount from right  Shoulder pain RIGHT November in [place] in shower  Repeat event on the LEFT this past week in shower as well; pain medication helped     Severe bilateral rotator cuff tear arthropathy  Left sided Defibrillator     Patient and daughter acknowledge that not good surgical candidate based on medical comorbidities     Aspirations and injections in the past     [employer] worker     Very supportive daughter present     Niece: Reyna Thornton    9/1/2022:  Bilateral chronic shoulder pain, better, uses cane/walker, physical  therapy  Intermittent swelling, glenohumeral joint pain    89th birthday 2 days ago,  89th about 2 weeks ago and recent hospitalization      Subjective   Subjective      Review of Systems   Constitutional: Negative.  Negative for chills, fatigue and fever.   HENT: Negative.  Negative for congestion and dental problem.    Eyes: Negative.  Negative for blurred vision.   Respiratory: Negative.  Negative for shortness of breath.    Cardiovascular: Negative.  Negative for leg swelling.   Gastrointestinal: Negative.  Negative for abdominal pain.   Endocrine: Negative.  Negative for polyuria.   Genitourinary: Negative.  Negative for difficulty urinating.   Musculoskeletal: Positive for arthralgias.   Skin: Negative.    Allergic/Immunologic: Negative.    Neurological: Negative.    Hematological: Negative.  Negative for adenopathy.   Psychiatric/Behavioral: Negative.  Negative for behavioral problems.        Past Medical History:   Past Medical History:   Diagnosis Date   • Corns and callosities    • Cough    • Diabetes mellitus (HCC)    • Foot pain    • Hyperlipidemia    • Hypertension    • Low back pain    • Osteoporosis    • Peripheral venous insufficiency    • Pneumonia    • Toe pain        Past Surgical History:   Past Surgical History:   Procedure Laterality Date   • BACK SURGERY  05/2013    REDDING   • PACEMAKER IMPLANTATION     • TUBAL ABDOMINAL LIGATION         Family History:   Family History   Problem Relation Age of Onset   • Breast cancer Daughter 43   • Breast cancer Maternal Aunt         DX AGE UNKNOWN   • Breast cancer Maternal Aunt         DX AGE UNKNOWN   • Ovarian cancer Neg Hx        Social History:   Social History     Socioeconomic History   • Marital status:    Tobacco Use   • Smoking status: Never Smoker   Substance and Sexual Activity   • Alcohol use: No   • Drug use: No       Medications:   Current Outpatient Medications:   •  aspirin 81 MG EC tablet, Take 81 mg by mouth Daily., Disp: ,  "Rfl:   •  atorvastatin (LIPITOR) 40 MG tablet, TAKE 1 TABLET BY MOUTH ONCE DAILY AT NIGHT AT BEDTIME, Disp: , Rfl:   •  Cholecalciferol (VITAMIN D3) 5000 UNITS capsule capsule, Take 5,000 Units by mouth Daily., Disp: , Rfl:   •  clopidogrel (PLAVIX) 75 MG tablet, Take 75 mg by mouth Daily., Disp: , Rfl:   •  cyclobenzaprine (FLEXERIL) 10 MG tablet, Take 10 mg by mouth 3 (Three) Times a Day As Needed for muscle spasms., Disp: , Rfl:   •  dicyclomine (Bentyl) 10 MG capsule, Bentyl 10 mg capsule  Four times a day, Disp: , Rfl:   •  furosemide (LASIX) 20 MG tablet, Take 20 mg by mouth Daily., Disp: , Rfl:   •  glipizide (GLUCOTROL) 5 MG tablet, Take 10 mg by mouth 2 (Two) Times a Day., Disp: , Rfl:   •  glyburide (DIAbeta) 2.5 MG tablet, glyburide 2.5 mg tablet  Daily, Disp: , Rfl:   •  Januvia 100 MG tablet, Take 100 mg by mouth Daily., Disp: , Rfl:   •  Lancets (onetouch ultrasoft) lancets, USE 1 CARTRIDGE NEEDLE ONCE A DAY, Disp: , Rfl:   •  losartan (COZAAR) 100 MG tablet, Take 100 mg by mouth Every Night., Disp: , Rfl:   •  metoprolol succinate XL (TOPROL-XL) 25 MG 24 hr tablet, Take 25 mg by mouth Daily., Disp: , Rfl:   •  montelukast (SINGULAIR) 10 MG tablet, Take 10 mg by mouth Daily As Needed., Disp: , Rfl:   •  OneTouch Ultra test strip, USE 1 STRIP ONCE A DAY, Disp: , Rfl:   •  pantoprazole (PROTONIX) 40 MG EC tablet, Take 40 mg by mouth Daily., Disp: , Rfl:   •  sAXagliptin HCl (ONGLYZA PO), Onglyza, Disp: , Rfl:   •  sodium polystyrene (KAYEXALATE) powder, , Disp: , Rfl:     Allergies: No Known Allergies    The following portions of the patient's history were reviewed and updated as appropriate: allergies, current medications, past family history, past medical history, past social history, past surgical history and problem list.        Objective    Objective      Vital Signs:   Vitals:    09/01/22 1045   BP: 114/76   Weight: 61.7 kg (136 lb 0.4 oz)   Height: 157.5 cm (62\")       Ortho Exam:  Bilateral " shoulder pain  Pseudoparalysis with CTA  Stable compared to prior  Baseline effusions noted no evidence of septic joint.  She has chronic rotator cuff tear arthropathy.  Stable appearing.    Results Review:  Imaging Results (Last 24 Hours)     ** No results found for the last 24 hours. **          Procedures          Assessment / Plan      Assessment/Plan:   Problem List Items Addressed This Visit        Musculoskeletal and Injuries    Rotator cuff tear arthropathy of both shoulders - Primary    Nontraumatic complete tear of right rotator cuff    Chronic pain of both shoulders    Nontraumatic complete tear of left rotator cuff          Bilateral rotator cuff tear arthropathy  Counseled on pathology again  Improvements noted    Follow Up: as needed      Ron Lomax MD, FAAOS  Orthopedic Surgeon  Fellowship Trained Shoulder and Elbow Surgeon  [facility]  Orthopedics and Sports Medicine  [address], [address]  [place] [address]    09/01/22  11:04 EDT

## 2022-09-21 ENCOUNTER — APPOINTMENT (OUTPATIENT)
Dept: MAMMOGRAPHY | Facility: HOSPITAL | Age: 87
End: 2022-09-21

## 2022-11-02 ENCOUNTER — HOSPITAL ENCOUNTER (OUTPATIENT)
Dept: MAMMOGRAPHY | Facility: HOSPITAL | Age: 87
Discharge: HOME | End: 2022-11-02
Admitting: FAMILY MEDICINE

## 2022-11-02 DIAGNOSIS — Z12.31 VISIT FOR SCREENING MAMMOGRAM: ICD-10-CM

## 2022-11-02 PROCEDURE — 77067 SCR MAMMO BI INCL CAD: CPT

## 2022-11-02 PROCEDURE — 77067 SCR MAMMO BI INCL CAD: CPT | Performed by: RADIOLOGY

## 2022-11-02 PROCEDURE — 77063 BREAST TOMOSYNTHESIS BI: CPT

## 2022-11-02 PROCEDURE — 77063 BREAST TOMOSYNTHESIS BI: CPT | Performed by: RADIOLOGY

## 2023-04-06 ENCOUNTER — TELEPHONE (OUTPATIENT)
Dept: ORTHOPEDIC SURGERY | Facility: CLINIC | Age: 88
End: 2023-04-06
Payer: MEDICARE

## 2023-04-06 NOTE — TELEPHONE ENCOUNTER
"Spoke with Alyson- patients daughter. She states that patient has new area of deeper redness on her right arm- mid humerus. She does not think that she has gotten a bug bite, but states that she is not sure. Her left arm has what she describes as \"flushing\" when her arm is swollen and she was wanting to know if it might be related. I explained that it does not sound like anything that is typical with swelling and that since we have not seen her in 7 months- it would be best served for her to maybe see her PCP first to rule out anything more worrisome such as bite or infection. She will give PCP a call today. If she wants to get an appointment with Dr Lomax after that, she will alivia back. She had no further problems or questions at this time.     Mary Kay  "

## 2023-04-06 NOTE — TELEPHONE ENCOUNTER
Patient's daughter( Alyson Cole) has some questions/concerns and would like someone from clinical staff to call her at [phone]

## 2023-04-12 NOTE — TELEPHONE ENCOUNTER
Patient's daughter called back about her mom's situation.  She reports that it hasn't really gotten worse but hasn't really gotten better either... she is currently finishing a round of antibiotics from her primary care and they would like Dr. Lomax to see her to confirm or see what is going on with her shoulders.  The patient has another appt today at 11 so if Alyson lizarraga answer she asks that we leave a message and she will call back after they get out of the appointment.

## 2023-04-12 NOTE — TELEPHONE ENCOUNTER
Left voicemail for Alyson to call the office and schedule Opal for the first available with Dr. Lomax.

## 2023-04-18 ENCOUNTER — OFFICE VISIT (OUTPATIENT)
Dept: ORTHOPEDIC SURGERY | Facility: CLINIC | Age: 88
End: 2023-04-18
Payer: MEDICARE

## 2023-04-18 VITALS
HEIGHT: 62 IN | SYSTOLIC BLOOD PRESSURE: 120 MMHG | DIASTOLIC BLOOD PRESSURE: 60 MMHG | BODY MASS INDEX: 21.9 KG/M2 | WEIGHT: 119 LBS

## 2023-04-18 DIAGNOSIS — M12.811 ROTATOR CUFF TEAR ARTHROPATHY OF RIGHT SHOULDER: ICD-10-CM

## 2023-04-18 DIAGNOSIS — L03.113 CELLULITIS OF RIGHT UPPER EXTREMITY: Primary | ICD-10-CM

## 2023-04-18 DIAGNOSIS — M75.101 ROTATOR CUFF TEAR ARTHROPATHY OF RIGHT SHOULDER: ICD-10-CM

## 2023-04-18 PROCEDURE — 99213 OFFICE O/P EST LOW 20 MIN: CPT | Performed by: ORTHOPAEDIC SURGERY

## 2023-04-18 RX ORDER — SULFAMETHOXAZOLE AND TRIMETHOPRIM 800; 160 MG/1; MG/1
TABLET ORAL
COMMUNITY
Start: 2023-04-17

## 2023-04-18 RX ORDER — LORATADINE 10 MG/1
10 TABLET ORAL DAILY
COMMUNITY

## 2023-04-18 RX ORDER — CARVEDILOL 12.5 MG/1
1 TABLET ORAL EVERY 12 HOURS SCHEDULED
COMMUNITY
Start: 2023-03-02

## 2023-04-18 NOTE — PROGRESS NOTES
[facility] Orthopaedic Surgery Office Follow Up       Office Follow Up Visit       Patient Name: Laverne Cole    Chief Complaint:   Chief Complaint   Patient presents with   • Follow-up     7 month f/u--Right shoulder pain         Referring Physician: No ref. provider found    History of Present Illness:   It has been 7  month(s) since Laverne Cole's last visit. Laverne Cole returns to clinic today for F/U: follow-up of rightBody Part: shoulderReason: redness. The issue has been ongoing for 3 week(s). Laverne Cole rates HIS/HER: herpain at 0/10 on the pain scale. Previous/current treatments: nothing. Current symptoms:Symptoms: swelling and redness.  Overall, he/she: sheis doing better.  I have reviewed the patient's history of present illness as noted/entered above.    I have reviewed the patient's past medical history, surgical history, social history, family history, medications, and allergies as noted in the electronic medical record and as noted/entered.  I have reviewed the patient's review of systems as noted/enter and updated as noted in the patient's HPI.    Bilateral severe rotator cuff tear arthropathy  Supportive daughter present today  She has an incidentally noted soft tissue area of erythema/cellulitis distal to the shoulder more in the midportion of the arm.  Fortunately she does not have the appearance of septic joint.    Her PCP is managing with oral antibiotics just recently started on Friday transition from clindamycin to Bactrim DS and they do feel that this is leading to improvements.    The primary team has done a nice job of adding antibiotics once the development of cellulitis came about.  She does appear to be responding to the Bactrim DS.    Counseled that the PCP team can continue to manage the soft tissue area if the shoulder becomes involved happy to see her back.  Additionally they can consider referral to infectious disease  if necessary if additional antibiotic course is indicated.  Fortunately does not appear that she would need IV antibiotics at this time as she is responding.  This appears to be superficial no obvious deep involvement no drainable abscess is noted clinically.      Subjective   Subjective      Review of Systems   Constitutional: Negative.  Negative for chills, fatigue and fever.   HENT: Negative.  Negative for congestion and dental problem.    Eyes: Negative.  Negative for blurred vision.   Respiratory: Negative.  Negative for shortness of breath.    Cardiovascular: Negative.  Negative for leg swelling.   Gastrointestinal: Negative.  Negative for abdominal pain.   Endocrine: Negative.  Negative for polyuria.   Genitourinary: Negative.  Negative for difficulty urinating.   Musculoskeletal: Positive for arthralgias.   Skin: Negative.    Allergic/Immunologic: Negative.    Neurological: Negative.    Hematological: Negative.  Negative for adenopathy.   Psychiatric/Behavioral: Negative.  Negative for behavioral problems.        Past Medical History:   Past Medical History:   Diagnosis Date   • Corns and callosities    • Cough    • Diabetes mellitus    • Foot pain    • Hyperlipidemia    • Hypertension    • Low back pain    • Osteoporosis    • Peripheral venous insufficiency    • Pneumonia    • Toe pain        Past Surgical History:   Past Surgical History:   Procedure Laterality Date   • BACK SURGERY  05/2013    REDDING   • PACEMAKER IMPLANTATION     • TUBAL ABDOMINAL LIGATION         Family History:   Family History   Problem Relation Age of Onset   • Breast cancer Daughter 43   • Breast cancer Maternal Aunt         DX AGE UNKNOWN   • Breast cancer Maternal Aunt         DX AGE UNKNOWN   • Ovarian cancer Neg Hx        Social History:   Social History     Socioeconomic History   • Marital status:    Tobacco Use   • Smoking status: Never   Substance and Sexual Activity   • Alcohol use: No   • Drug use: No        Medications:   Current Outpatient Medications:   •  aspirin 81 MG EC tablet, Take 1 tablet by mouth Daily., Disp: , Rfl:   •  atorvastatin (LIPITOR) 40 MG tablet, TAKE 1 TABLET BY MOUTH ONCE DAILY AT NIGHT AT BEDTIME, Disp: , Rfl:   •  carvedilol (COREG) 12.5 MG tablet, Take 1 tablet by mouth Every 12 (Twelve) Hours., Disp: , Rfl:   •  Cholecalciferol (VITAMIN D3) 5000 UNITS capsule capsule, Take 1 capsule by mouth Daily., Disp: , Rfl:   •  clopidogrel (PLAVIX) 75 MG tablet, Take 1 tablet by mouth Daily., Disp: , Rfl:   •  cyclobenzaprine (FLEXERIL) 10 MG tablet, Take 1 tablet by mouth 3 (Three) Times a Day As Needed for Muscle Spasms., Disp: , Rfl:   •  dicyclomine (BENTYL) 10 MG capsule, Bentyl 10 mg capsule  Four times a day, Disp: , Rfl:   •  furosemide (LASIX) 20 MG tablet, Take 1 tablet by mouth Daily., Disp: , Rfl:   •  glipizide (GLUCOTROL) 5 MG tablet, Take 2 tablets by mouth 2 (Two) Times a Day., Disp: , Rfl:   •  glyburide (DIAbeta) 2.5 MG tablet, glyburide 2.5 mg tablet  Daily, Disp: , Rfl:   •  Januvia 100 MG tablet, Take 1 tablet by mouth Daily., Disp: , Rfl:   •  Lancets (onetouch ultrasoft) lancets, USE 1 CARTRIDGE NEEDLE ONCE A DAY, Disp: , Rfl:   •  loratadine (CLARITIN) 10 MG tablet, Take 1 tablet by mouth Daily., Disp: , Rfl:   •  losartan (COZAAR) 100 MG tablet, Take 1 tablet by mouth Every Night., Disp: , Rfl:   •  metoprolol succinate XL (TOPROL-XL) 25 MG 24 hr tablet, Take 1 tablet by mouth Daily., Disp: , Rfl:   •  montelukast (SINGULAIR) 10 MG tablet, Take 1 tablet by mouth Daily As Needed., Disp: , Rfl:   •  OneTouch Ultra test strip, USE 1 STRIP ONCE A DAY, Disp: , Rfl:   •  pantoprazole (PROTONIX) 40 MG EC tablet, Take 1 tablet by mouth Daily., Disp: , Rfl:   •  sAXagliptin HCl (ONGLYZA PO), Onglyza, Disp: , Rfl:   •  sodium polystyrene (KAYEXALATE) powder, , Disp: , Rfl:   •  sulfamethoxazole-trimethoprim (BACTRIM DS,SEPTRA DS) 800-160 MG per tablet, , Disp: , Rfl:     Allergies:  "No Known Allergies    The following portions of the patient's history were reviewed and updated as appropriate: allergies, current medications, past family history, past medical history, past social history, past surgical history and problem list.        Objective    Objective      Vital Signs:   Vitals:    04/18/23 1509   BP: 120/60   Weight: 54 kg (119 lb)   Height: 157.5 cm (62.01\")       Ortho Exam:  Right mid arm anterior portion cellulitis with small area perhaps developing superficial abscess.  No deep involvement noted no evidence of septic joint.  Right shoulder is well-appearing clinically with known chronic severe rotator cuff tear arthropathy    Results Review:  Imaging Results (Last 24 Hours)     ** No results found for the last 24 hours. **          Procedures            Assessment / Plan      Assessment/Plan:   Problem List Items Addressed This Visit        Musculoskeletal and Injuries    Rotator cuff tear arthropathy of right shoulder       Other    Cellulitis of right upper extremity - Primary     Right mid arm cellulitis and possible early superficial abscess  As noted in HPI:  Her PCP is managing with oral antibiotics just recently started on Friday transition from clindamycin to Bactrim DS and they do feel that this is leading to improvements.    Counseled that the PCP team can continue to manage the soft tissue area if the shoulder becomes involved happy to see her back.  Additionally they can consider referral to infectious disease if necessary if additional antibiotic course is indicated.  Fortunately does not appear that she would need IV antibiotics at this time as she is responding.  This appears to be superficial no obvious deep involvement no drainable abscess is noted clinically.    Supportive daughter present.  Happy to have them reach out to me if any issues arise or if anyway we can be of support.    Fortunately no evidence of septic joint and no obvious deep abscess on " exam      Follow Up: Keep me updated pending progress she does appear to be responding to oral antibiotics.      Ron Lomax MD, FAAOS  Orthopedic Surgeon  Fellowship Trained Shoulder and Elbow Surgeon  [facility]  Orthopedics and Sports Medicine  [address], [address]  [place] [address]    04/18/23  16:06 EDT

## 2024-08-08 ENCOUNTER — OFFICE VISIT (OUTPATIENT)
Dept: ORTHOPEDIC SURGERY | Facility: CLINIC | Age: 89
End: 2024-08-08
Payer: MEDICARE

## 2024-08-08 VITALS
WEIGHT: 107.2 LBS | BODY MASS INDEX: 20.24 KG/M2 | SYSTOLIC BLOOD PRESSURE: 116 MMHG | DIASTOLIC BLOOD PRESSURE: 70 MMHG | HEIGHT: 61 IN

## 2024-08-08 DIAGNOSIS — M75.102 ROTATOR CUFF TEAR ARTHROPATHY OF BOTH SHOULDERS: ICD-10-CM

## 2024-08-08 DIAGNOSIS — R20.0 BILATERAL HAND NUMBNESS: Primary | ICD-10-CM

## 2024-08-08 DIAGNOSIS — M12.812 ROTATOR CUFF TEAR ARTHROPATHY OF BOTH SHOULDERS: ICD-10-CM

## 2024-08-08 DIAGNOSIS — M12.811 ROTATOR CUFF TEAR ARTHROPATHY OF BOTH SHOULDERS: ICD-10-CM

## 2024-08-08 DIAGNOSIS — M75.101 ROTATOR CUFF TEAR ARTHROPATHY OF BOTH SHOULDERS: ICD-10-CM

## 2024-08-08 PROCEDURE — 99213 OFFICE O/P EST LOW 20 MIN: CPT | Performed by: ORTHOPAEDIC SURGERY

## 2024-08-08 RX ORDER — DIPHENOXYLATE HYDROCHLORIDE AND ATROPINE SULFATE 2.5; .025 MG/1; MG/1
1 TABLET ORAL DAILY
COMMUNITY

## 2024-08-08 NOTE — PROGRESS NOTES
[facility] Orthopaedic Surgery Office Follow Up - Ron Lomax MD  [facility]    Office Follow Up Visit       Patient Name: Laverne Cole    Chief Complaint:   Chief Complaint   Patient presents with    Follow-up     1 year follow-up: Cellulitis of right upper extremity; Rotator cuff tear arthropathy of both shoulders         Referring Physician: No ref. provider found    History of Present Illness:   It has been 1  year(s) since Laverne Cole's last visit. Laverne Cole returns to clinic today for F/U of bilateralBody Part: shoulderReason: pain. The issue has been ongoing for 2 year(s). Laverne Cole rates HIS/HER: herpain at 1/10 on the pain scale. Previous/current treatments: nothing. Current symptoms:Symptoms: same as prior visit. The pain is worse with sleeping and lying on affected side; rest improves the pain. Overall, he/she: sheis doing the same. I have reviewed the patient's history of present illness as noted/entered above.    I have reviewed the patient's past medical history, surgical history, social history, family history, medications, and allergies as noted in the electronic medical record and as noted/entered.  I have reviewed the patient's review of systems as noted/enter and updated as noted in the patient's HPI.      8/8/2024:  Bilateral shoulder severe rotator cuff tear arthropathy.  Her prior cellulitis resolved.    Supportive daughter Alyson with her today.  She has a sister that 7 years younger and her brother rBandan is roughly 18 years younger.    Her shoulders are fairly stable.  No recent trauma.    Her main concern is bilateral hand numbness and tingling she has had some baseline weakness of her hands she has had bilateral carpal tunnel releases approximately 10 years prior.  Shoulder findings appear stable Main issue today she notes is her feelings of her  hand      4/18/2023:  Bilateral severe rotator cuff tear arthropathy  Supportive daughter present today  She has an incidentally noted soft tissue area of erythema/cellulitis distal to the shoulder more in the midportion of the arm.  Fortunately she does not have the appearance of septic joint.     Her PCP is managing with oral antibiotics just recently started on Friday transition from clindamycin to Bactrim DS and they do feel that this is leading to improvements.     The primary team has done a nice job of adding antibiotics once the development of cellulitis came about.  She does appear to be responding to the Bactrim DS.     Counseled that the PCP team can continue to manage the soft tissue area if the shoulder becomes involved happy to see her back.  Additionally they can consider referral to infectious disease if necessary if additional antibiotic course is indicated.  Fortunately does not appear that she would need IV antibiotics at this time as she is responding.  This appears to be superficial no obvious deep involvement no drainable abscess is noted clinically.    Prior history:  Reviewed note from Dr. Vaz. 80ml aspirated from left shoulder and smaller amount from right  Shoulder pain RIGHT November in [place] in shower  Repeat event on the LEFT this past week in shower as well; pain medication helped     Severe bilateral rotator cuff tear arthropathy  Left sided Defibrillator     Patient and daughter acknowledge that not good surgical candidate based on medical comorbidities     Aspirations and injections in the past     [employer] worker     Very supportive daughter present     Niece: Reyna Thornton     9/1/2022:  Bilateral chronic shoulder pain, better, uses cane/walker, physical therapy  Intermittent swelling, glenohumeral joint pain     89th birthday 2 days ago,  89th about 2 weeks ago and recent hospitalization      Subjective   Subjective      Review of Systems   Constitutional:  Negative.  Negative for chills, fatigue and fever.   HENT: Negative.  Negative for congestion and dental problem.    Eyes: Negative.  Negative for blurred vision.   Respiratory: Negative.  Negative for shortness of breath.    Cardiovascular: Negative.  Negative for leg swelling.   Gastrointestinal: Negative.  Negative for abdominal pain.   Endocrine: Negative.  Negative for polyuria.   Genitourinary: Negative.  Negative for difficulty urinating.   Musculoskeletal:  Positive for arthralgias.   Skin: Negative.    Allergic/Immunologic: Negative.    Neurological: Negative.    Hematological: Negative.  Negative for adenopathy.   Psychiatric/Behavioral: Negative.  Negative for behavioral problems.         Past Medical History:   Past Medical History:   Diagnosis Date    Corns and callosities     Cough     Diabetes mellitus     Foot pain     Hyperlipidemia     Hypertension     Low back pain     Osteoporosis     Peripheral venous insufficiency     Pneumonia     Toe pain        Past Surgical History:   Past Surgical History:   Procedure Laterality Date    BACK SURGERY  05/2013    REDDING    PACEMAKER IMPLANTATION      TUBAL ABDOMINAL LIGATION         Family History:   Family History   Problem Relation Age of Onset    Breast cancer Daughter 43    Breast cancer Maternal Aunt         DX AGE UNKNOWN    Breast cancer Maternal Aunt         DX AGE UNKNOWN    Ovarian cancer Neg Hx        Social History:   Social History     Socioeconomic History    Marital status:    Tobacco Use    Smoking status: Never     Passive exposure: Past   Vaping Use    Vaping status: Never Used   Substance and Sexual Activity    Alcohol use: No    Drug use: No    Sexual activity: Not Currently     Partners: Male       Medications:   Current Outpatient Medications:     aspirin 81 MG EC tablet, Take 1 tablet by mouth Daily., Disp: , Rfl:     atorvastatin (LIPITOR) 40 MG tablet, TAKE 1 TABLET BY MOUTH ONCE DAILY AT NIGHT AT BEDTIME, Disp: , Rfl:      carvedilol (COREG) 12.5 MG tablet, Take 1 tablet by mouth Every 12 (Twelve) Hours., Disp: , Rfl:     Cholecalciferol (VITAMIN D3) 5000 UNITS capsule capsule, Take 1 capsule by mouth Daily., Disp: , Rfl:     cyclobenzaprine (FLEXERIL) 10 MG tablet, Take 1 tablet by mouth 3 (Three) Times a Day As Needed for Muscle Spasms., Disp: , Rfl:     dicyclomine (BENTYL) 10 MG capsule, Bentyl 10 mg capsule  Four times a day, Disp: , Rfl:     furosemide (LASIX) 20 MG tablet, Take 1 tablet by mouth Daily., Disp: , Rfl:     glipizide (GLUCOTROL) 5 MG tablet, Take 2 tablets by mouth 2 (Two) Times a Day., Disp: , Rfl:     glyburide (DIAbeta) 2.5 MG tablet, glyburide 2.5 mg tablet  Daily, Disp: , Rfl:     Lancets (onetouch ultrasoft) lancets, USE 1 CARTRIDGE NEEDLE ONCE A DAY, Disp: , Rfl:     loratadine (CLARITIN) 10 MG tablet, Take 1 tablet by mouth Daily., Disp: , Rfl:     losartan (COZAAR) 100 MG tablet, Take 1 tablet by mouth Every Night., Disp: , Rfl:     metoprolol succinate XL (TOPROL-XL) 25 MG 24 hr tablet, Take 1 tablet by mouth Daily., Disp: , Rfl:     montelukast (SINGULAIR) 10 MG tablet, Take 1 tablet by mouth Daily As Needed., Disp: , Rfl:     Multi-Vitamin tablet tablet, Take 1 tablet by mouth Daily., Disp: , Rfl:     OneTouch Ultra test strip, USE 1 STRIP ONCE A DAY, Disp: , Rfl:     pantoprazole (PROTONIX) 40 MG EC tablet, Take 1 tablet by mouth Daily., Disp: , Rfl:     SITagliptin-metFORMIN HCl ER (JANUMET XR) 100-1000 MG tablet, Take 1 tablet by mouth., Disp: , Rfl:     sodium polystyrene (KAYEXALATE) powder, , Disp: , Rfl:     clopidogrel (PLAVIX) 75 MG tablet, Take 1 tablet by mouth Daily., Disp: , Rfl:     Allergies:   Allergies   Allergen Reactions    Tramadol Nausea And Vomiting     Other reaction(s): Vomiting    Codeine Other (See Comments)     Other reaction(s): Other (See Comments)       The following portions of the patient's history were reviewed and updated as appropriate: allergies, current medications,  "past family history, past medical history, past social history, past surgical history and problem list.        Objective    Objective      Vital Signs:   Vitals:    08/08/24 1143   BP: 116/70   Weight: 48.6 kg (107 lb 3.2 oz)   Height: 154.9 cm (61\")       Ortho Exam:  Bilateral shoulders have severe limitations at baseline with known rotator cuff tear arthropathy skin is well-appearing mild effusions bilaterally consistent with cuff tear arthropathy but much better.  No evidence of cellulitis.  She has bilateral hand numbness and tingling-she notes she has had bilateral carpal tunnel releases in the past.    Results Review:  Imaging Results (Last 24 Hours)       ** No results found for the last 24 hours. **            Procedures            Assessment / Plan      Assessment/Plan:   Problem List Items Addressed This Visit          Musculoskeletal and Injuries    Rotator cuff tear arthropathy of both shoulders       Symptoms and Signs    Bilateral hand numbness - Primary    Relevant Orders    EMG & Nerve Conduction Test       EMG/NCV of the bilateral upper extremities are recommended.  Indication for EMG/NCV: Bilateral hand numbness and tingling  History of prior carpal tunnel releases bilaterally  EMG/NCV is critical to evaluate for ongoing neurological findings on examination and based on history to evaluate.  EMG/NCV will be critical to help direct the patient's care regarding ongoing neurological findings.    Bilateral shoulders okay to proceed with home exercise program.  She has done well with this in the past.    Bilateral hand numbness and tingling-EMG/NCV recommended bilateral upper extremities and then follow-up with our hand team at the [facility] [place] for additional evaluation.      Follow Up: She will see me back as needed      Ron Lomax MD, FAAOS  Orthopedic Surgeon, Shoulder Surgery  [facility]  Orthopedics and Sports Medicine  [facility], [address]  [place], [place] " [address]    & New Location:  [facility] Location  [address], [address]  [place] [address]    08/08/24  12:14 EDT

## 2024-08-16 ENCOUNTER — TELEPHONE (OUTPATIENT)
Age: 89
End: 2024-08-16
Payer: MEDICARE

## 2024-08-16 ENCOUNTER — TELEPHONE (OUTPATIENT)
Dept: ORTHOPEDIC SURGERY | Facility: CLINIC | Age: 89
End: 2024-08-16
Payer: MEDICARE

## 2024-08-16 NOTE — TELEPHONE ENCOUNTER
Patient coming in today.    Audrey HAYNES (LARY LAWS        Left message for patient to call back to schedule her appt with Dr Amado on Tuesday here at [place].    Audrey HAYNES (KALI) EUSEBIA

## 2024-08-20 ENCOUNTER — OFFICE VISIT (OUTPATIENT)
Age: 89
End: 2024-08-20
Payer: MEDICARE

## 2024-08-20 VITALS
HEIGHT: 59 IN | WEIGHT: 106 LBS | SYSTOLIC BLOOD PRESSURE: 116 MMHG | DIASTOLIC BLOOD PRESSURE: 78 MMHG | BODY MASS INDEX: 21.37 KG/M2

## 2024-08-20 DIAGNOSIS — G56.20 CUBITAL TUNNEL SYNDROME, UNSPECIFIED LATERALITY: ICD-10-CM

## 2024-08-20 DIAGNOSIS — M79.641 BILATERAL HAND PAIN: ICD-10-CM

## 2024-08-20 DIAGNOSIS — M79.642 BILATERAL HAND PAIN: ICD-10-CM

## 2024-08-20 DIAGNOSIS — G56.03 BILATERAL CARPAL TUNNEL SYNDROME: Primary | ICD-10-CM

## 2024-08-20 PROCEDURE — 20526 THER INJECTION CARP TUNNEL: CPT | Performed by: STUDENT IN AN ORGANIZED HEALTH CARE EDUCATION/TRAINING PROGRAM

## 2024-08-20 PROCEDURE — 99204 OFFICE O/P NEW MOD 45 MIN: CPT | Performed by: STUDENT IN AN ORGANIZED HEALTH CARE EDUCATION/TRAINING PROGRAM

## 2024-08-20 PROCEDURE — 1160F RVW MEDS BY RX/DR IN RCRD: CPT | Performed by: STUDENT IN AN ORGANIZED HEALTH CARE EDUCATION/TRAINING PROGRAM

## 2024-08-20 PROCEDURE — 1159F MED LIST DOCD IN RCRD: CPT | Performed by: STUDENT IN AN ORGANIZED HEALTH CARE EDUCATION/TRAINING PROGRAM

## 2024-08-20 RX ORDER — TRIAMCINOLONE ACETONIDE 40 MG/ML
20 INJECTION, SUSPENSION INTRA-ARTICULAR; INTRAMUSCULAR
Status: COMPLETED | OUTPATIENT
Start: 2024-08-20 | End: 2024-08-20

## 2024-08-20 RX ORDER — LIDOCAINE HYDROCHLORIDE 10 MG/ML
0.5 INJECTION, SOLUTION EPIDURAL; INFILTRATION; INTRACAUDAL; PERINEURAL
Status: COMPLETED | OUTPATIENT
Start: 2024-08-20 | End: 2024-08-20

## 2024-08-20 RX ADMIN — LIDOCAINE HYDROCHLORIDE 0.5 ML: 10 INJECTION, SOLUTION EPIDURAL; INFILTRATION; INTRACAUDAL; PERINEURAL at 11:36

## 2024-08-20 RX ADMIN — TRIAMCINOLONE ACETONIDE 20 MG: 40 INJECTION, SUSPENSION INTRA-ARTICULAR; INTRAMUSCULAR at 11:36

## 2024-08-20 NOTE — PROGRESS NOTES
[facility]     Office Visit       Date: 08/20/2024   Patient Name: Laverne Cole  MRN: [id number]  YOB: 1933    Referring Physician: Steve Cortes MD     Chief Complaint:   Chief Complaint   Patient presents with    Left Hand - Pain    Right Hand - Pain     History of Present Illness:   Laverne Cole is a 89 y.o. female right-hand-dominant presented clinic with complaints of bilateral hand numbness and tingling.  She denies any specific pain but states that she is having difficulty with fine dexterity movements as she has altered sensation throughout the palm and digits.  She states this has been more noticeable within the past 2 months.  She does have a history of prior carpal tunnel releases performed over 10 years ago by Dr. Mercado.  She states that these symptoms are somewhat different because they do not awaken her at night.  She has not attempted any nighttime bracing or injections.  She overall is having difficulty with fine motor skills of the hands.  She presents today with an EMG.  No other complaints or concerns.    PMH: Hypertension, hyperlipidemia, type 2 diabetes.    Subjective   Review of Systems:   Review of Systems   Constitutional: Negative.  Negative for chills, fatigue and fever.   HENT: Negative.  Negative for congestion and dental problem.    Eyes: Negative.  Negative for blurred vision.   Respiratory: Negative.  Negative for shortness of breath.    Cardiovascular: Negative.  Negative for leg swelling.   Gastrointestinal: Negative.  Negative for abdominal pain.   Endocrine: Negative.  Negative for polyuria.   Genitourinary: Negative.  Negative for difficulty urinating.   Musculoskeletal:  Positive for arthralgias.   Skin: Negative.    Allergic/Immunologic: Negative.    Neurological: Negative.    Hematological: Negative.  Negative for adenopathy.   Psychiatric/Behavioral: Negative.  Negative  "for behavioral problems.       Pertinent review of systems per HPI    I reviewed the patient's chief complaint, history of present illness, review of systems, past medical history, surgical history, family history, social history, medications and allergy list in the EMR on 08/20/2024 and agree with the findings above.    Objective    Vital Signs:   Vitals:    08/20/24 1101   BP: 116/78   Weight: 48.1 kg (106 lb)   Height: 149.5 cm (58.86\")     BMI: BMI is within normal parameters. No other follow-up for BMI required.     General: No acute distress. Alert and oriented.   Cardiovascular: Palpable radial pulse.   Respiratory: Breathing is nonlabored.     Ortho Exam:  Examination of bilateral upper extremities demonstrates deformities with nodule formation throughout the DIP and PIP joints.  Diffuse skin thinning and atrophy of the interossei bilaterally.  No open skin lesions or abrasions.  There is no tenderness to palpation throughout the wrist hand or digits.  Positive Tinel's on the left negative on the right.  Negative Tinel's at bilateral elbows.  Decreased sensation throughout the median and ulnar nerve distributions on the left.  Intact sensation on the right.  She is able to make a composite fist.  The A1 pulleys are nontender to palpation.  Warm and well-perfused distally.    Imaging / Studies:    Imaging Results (Last 24 Hours)       Procedure Component Value Units Date/Time    XR Hand 3+ View Bilateral [[id number]] Resulted: 08/20/24 1121     Updated: 08/20/24 1218    Narrative:      Bilateral Hand X-Ray    Indication: Pain    Views:  PA, lateral, oblique    Comparison:  None    Findings:  No fractures or dislocation.  Diffuse osteopenia throughout the wrist   carpus and digits.  Significant arthritic changes noted throughout the   hand wrist and digits most notable at the DIP joints.  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               EMG nerve conduction study performed " on 8/15/2024 demonstrates moderate to severe bilateral carpal tunnel syndrome, mild right cubital tunnel syndrome, moderate left cubital tunnel syndrome.    Procedure Note:  After reviewing the risks, benefits and alternatives to a steroid injection, which include but are not limited to; hypopigmentation, fat necrosis/atrophy, pain, swelling, bleeding, bruising, damage to nearby nerves/vessels, allergic reaction , transient elevation in blood glucose levels and infection a verbal consent was obtained. A time-out was then performed and the affected hand was prepped with chlorhexadine soap and ethyl chloride was used to numb the skin. The left carpal tunnel was injected with 0.5cc: 0.5cc mixture of Kenalog - 40 mg/ml and Lidocaine - 1% / 2 ml. The injection was well tolerated and a sterile dressing was applied. There were no complications. I advised the patient that they might experience some local discomfort for the next couple days and can apply ice to the site as needed.    Assessment / Plan    Assessment/Plan:   Laverne Cole is a 89 y.o. female with bilateral carpal and cubital tunnel syndrome.    I discussed with the patient their clinical and electrodiagnostic findings demonstrate carpal and cubital and the ulnar nerve at the tunnel syndrome.  The pathophysiology of the condition, including compression of the median nerve in the carpal tunnel and cubital tunnel, was explained in detail.  It was also discussed that with more severe symptomatology, such as persistent and worsening paresthesias, that permanent nerve damage may result, which may make improvement after surgery less predictable.  Both conservative and surgical options were discussed.  Conservative treatments in the form of: observation, gentle nerve gliding exercises, night time splinting, and injection were presented.  Operative treatment in the form of open carpal tunnel release was presented and reiterated that the goal of surgery is to prevent  further compression and damage to the nerve.  I discussed with the patient and daughter that the EMG findings of severe carpal tunnel may be residual from her prior surgery.  I discussed a therapeutic and diagnostic corticosteroid injection into the carpal tunnel.  She was agreeable and was performed on the left today.  She was also provided a nighttime splint.  Will monitor her response to these interventions.  We could consider a repeat carpal tunnel release if she experiences improvements of her symptoms.  However, we did discuss that the EMG demonstrates cubital tunnel syndrome.  Given the patient's age and functionality, I would likely not recommend surgical treatment as this would require anesthesia.  The patient and daughter expressed understanding of my recommendations and the plan moving forward.  I will see them back in 3 months for reevaluation.  She may return to clinic earlier if she wishes to proceed with a corticosteroid injection on the right side.  All questions and concerns were addressed.  She was agreeable.      ICD-10-CM ICD-9-CM   1. Bilateral hand pain  M79.641 729.5    M79.642    2. Bilateral carpal tunnel syndrome  G56.03 354.0   3. Cubital tunnel syndrome, unspecified laterality  G56.20 354.2     Follow Up:   Return in about 3 months (around 11/20/2024) for Follow Up.      Alem Amado MD  [facility]

## 2024-08-20 NOTE — PROGRESS NOTES
Procedure   - Hand/Upper Extremity Injection: L carpal tunnel for carpal tunnel syndrome on 8/20/2024 11:36 AM  Indications: pain  Details: 27 G (long) needle, radial approach  Medications: 0.5 mL lidocaine PF 1% 1 %; 20 mg triamcinolone acetonide 40 MG/ML  Outcome: tolerated well, no immediate complications  Procedure, treatment alternatives, risks and benefits explained, specific risks discussed. Consent was given by the patient. Immediately prior to procedure a time out was called to verify the correct patient, procedure, equipment, support staff and site/side marked as required. Patient was prepped and draped in the usual sterile fashion.

## 2024-11-19 ENCOUNTER — OFFICE VISIT (OUTPATIENT)
Age: 89
End: 2024-11-19
Payer: MEDICARE

## 2024-11-19 VITALS
SYSTOLIC BLOOD PRESSURE: 126 MMHG | BODY MASS INDEX: 21.67 KG/M2 | WEIGHT: 107.5 LBS | HEIGHT: 59 IN | DIASTOLIC BLOOD PRESSURE: 58 MMHG

## 2024-11-19 DIAGNOSIS — G56.03 BILATERAL CARPAL TUNNEL SYNDROME: Primary | ICD-10-CM

## 2024-11-19 DIAGNOSIS — G56.20 CUBITAL TUNNEL SYNDROME, UNSPECIFIED LATERALITY: ICD-10-CM

## 2024-11-19 PROCEDURE — 1160F RVW MEDS BY RX/DR IN RCRD: CPT | Performed by: STUDENT IN AN ORGANIZED HEALTH CARE EDUCATION/TRAINING PROGRAM

## 2024-11-19 PROCEDURE — 1159F MED LIST DOCD IN RCRD: CPT | Performed by: STUDENT IN AN ORGANIZED HEALTH CARE EDUCATION/TRAINING PROGRAM

## 2024-11-19 PROCEDURE — 99213 OFFICE O/P EST LOW 20 MIN: CPT | Performed by: STUDENT IN AN ORGANIZED HEALTH CARE EDUCATION/TRAINING PROGRAM

## 2024-11-19 NOTE — PROGRESS NOTES
[facility]     Office Visit       Date: 11/19/2024   Patient Name: Laverne Cole  MRN: [id number]  YOB: 1933    Referring Physician: Provider, No Known     Chief Complaint:   Chief Complaint   Patient presents with    Follow-up     3 month f/u-- Bilateral carpal tunnel syndrome     History of Present Illness:   Laverne Cole is a 89 y.o. female right-hand-dominant presenting to clinic for follow-up of bilateral carpal and cubital tunnel syndrome.  At the patient's last clinic visit she received a corticosteroid injection into the left carpal tunnel.  She reports no significant improvements after left carpal tunnel injection.  She complains mostly of numbness and tingling to the small and ring fingers that is worse on the left side.  She denies any pain and complains mostly of numbness and tingling.  No other complaints or concerns.    PMH: Hypertension, hyperlipidemia, type 2 diabetes.     PSH: bilateral carpal tunnel releases performed over 10 years ago by Dr. Mercado.     Subjective   Review of Systems:   Review of Systems   Constitutional:  Negative for chills, fever, unexpected weight gain and unexpected weight loss.   HENT:  Negative for congestion, postnasal drip and rhinorrhea.    Eyes:  Negative for blurred vision.   Respiratory:  Negative for shortness of breath.    Cardiovascular:  Negative for leg swelling.   Gastrointestinal:  Negative for abdominal pain, nausea and vomiting.   Genitourinary:  Negative for difficulty urinating.   Musculoskeletal:  Positive for arthralgias. Negative for gait problem, joint swelling and myalgias.   Skin:  Negative for skin lesions and wound.   Neurological:  Negative for dizziness, weakness, light-headedness and numbness.   Hematological:  Does not bruise/bleed easily.   Psychiatric/Behavioral:  Negative for depressed mood.       Pertinent review of systems per HPI    I  "reviewed the patient's chief complaint, history of present illness, review of systems, past medical history, surgical history, family history, social history, medications and allergy list in the EMR on 11/19/2024 and agree with the findings above.    Objective    Vital Signs:   Vitals:    11/19/24 1123   BP: 126/58   Weight: 48.8 kg (107 lb 8 oz)   Height: 149.5 cm (58.86\")     BMI: BMI is within normal parameters. No other follow-up for BMI required.     General: No acute distress. Alert and oriented.   Cardiovascular: Palpable radial pulse.   Respiratory: Breathing is nonlabored.     Ortho Exam:  Examination of the bilateral Upper Extremity:  No skin lesions or ecchymosis. No edema.   Interosseous wasting is visualized  Thenar atrophy is visualized    Hypothenar atrophy is visualized    negative Tinel's at the carpal tunnel, negative Durkan's compression test, negative Phalen's maneuver  negative Tinel's over Guyon's canal  positive Tinel's at the ulnar nerve at the elbow  positive Elbow Flexion Test  negative Subluxation of the ulnar nerve  negative tenderness with deep palpation of the pronator  negative Tinel's with median nerve at pronator  positive Froment's sign  4/5 APB strength  4/5 FDI strength         Sensation is decreased to light touch over the median and ulnar nerve distributions   Digits warm and well-perfused    Imaging / Studies:    Imaging Results (Last 24 Hours)       ** No results found for the last 24 hours. **        EMG nerve conduction study performed on 8/15/2024 demonstrates moderate to severe bilateral carpal tunnel syndrome, mild right cubital tunnel syndrome, moderate left cubital tunnel syndrome.     Assessment / Plan    Assessment/Plan:   Laverne Cole is a 89 y.o. female with bilateral carpal and cubital tunnel syndrome.     The patient had minimal improvements with left carpal tunnel injection.  She is most symptomatic to the left greater than right cubital tunnel.  I discussed " nonoperative treatment including nerve gliding exercises, nighttime extension splinting, positional modifications, and anti-inflammatories.  Surgical options would include ulnar nerve decompression at the elbow.  Given the patient's age and medical comorbidities, they did not wish to proceed with surgical treatment.  We did however discuss possible repeat carpal tunnel injections versus surgery, although I do not feel that this will improve her symptoms to the carpal tunnel which is minimally symptomatic for her currently.  They expressed understanding of all the options.  They will call the office as needed  Symptoms worsen or fail to improve.  All questions and concerns were addressed.  They were agreeable.      ICD-10-CM ICD-9-CM   1. Bilateral carpal tunnel syndrome  G56.03 354.0   2. Cubital tunnel syndrome, unspecified laterality  G56.20 354.2     Follow Up:   Return if symptoms worsen or fail to improve.      Alem Amado MD  [facility]

## 2025-03-20 ENCOUNTER — APPOINTMENT (OUTPATIENT)
Facility: HOSPITAL | Age: OVER 89
End: 2025-03-20
Payer: MEDICARE

## 2025-03-20 ENCOUNTER — HOSPITAL ENCOUNTER (EMERGENCY)
Facility: HOSPITAL | Age: OVER 89
Discharge: HOME | End: 2025-03-20
Attending: EMERGENCY MEDICINE
Payer: MEDICARE

## 2025-03-20 VITALS
WEIGHT: 110 LBS | HEART RATE: 70 BPM | BODY MASS INDEX: 21.6 KG/M2 | RESPIRATION RATE: 18 BRPM | DIASTOLIC BLOOD PRESSURE: 67 MMHG | HEIGHT: 60 IN | SYSTOLIC BLOOD PRESSURE: 140 MMHG | OXYGEN SATURATION: 97 % | TEMPERATURE: 97.9 F

## 2025-03-20 DIAGNOSIS — S70.00XA CONTUSION OF HIP, UNSPECIFIED LATERALITY, INITIAL ENCOUNTER: ICD-10-CM

## 2025-03-20 DIAGNOSIS — S02.2XXA CLOSED NONDISPLACED FRACTURE OF NASAL BONE, INITIAL ENCOUNTER: Primary | ICD-10-CM

## 2025-03-20 PROCEDURE — 70486 CT MAXILLOFACIAL W/O DYE: CPT

## 2025-03-20 PROCEDURE — 73502 X-RAY EXAM HIP UNI 2-3 VIEWS: CPT

## 2025-03-20 PROCEDURE — 72170 X-RAY EXAM OF PELVIS: CPT

## 2025-03-20 PROCEDURE — 70450 CT HEAD/BRAIN W/O DYE: CPT

## 2025-03-20 PROCEDURE — 99284 EMERGENCY DEPT VISIT MOD MDM: CPT

## 2025-03-21 NOTE — FSED PROVIDER NOTE
Subjective  History of Present Illness:    Patient is a 89-year-old female presenting to the emergency department after a fall.  She states she was walking downstairs into the garage and fell coming off of the last step.  She reportedly fell in between a deep freeze and a shelf, striking her face on a deep freeze.  No loss of consciousness.  She does take aspirin daily.  She has pain and swelling noted to her face, daughter states she was wearing her glasses at the time.  She denies neck, back pain.  Reportedly had a another fall last weekend and has bruising noted to her right hip.  She states she has been ambulating with only mild soreness throughout the week.      Nurses Notes reviewed and agree, including vitals, allergies, social history and prior medical history.     REVIEW OF SYSTEMS: All systems reviewed and not pertinent unless noted.  Review of Systems   Musculoskeletal:         Positive for pain, swelling   All other systems reviewed and are negative.      Past Medical History:   Diagnosis Date    Corns and callosities     Cough     Diabetes mellitus     Foot pain     Hyperlipidemia     Hypertension     Low back pain     Osteoporosis     Peripheral venous insufficiency     Pneumonia     Toe pain        Allergies:    Tramadol and Codeine      Past Surgical History:   Procedure Laterality Date    BACK SURGERY  05/2013    REDDING    PACEMAKER IMPLANTATION      TUBAL ABDOMINAL LIGATION           Social History     Socioeconomic History    Marital status:    Tobacco Use    Smoking status: Never     Passive exposure: Past   Vaping Use    Vaping status: Never Used   Substance and Sexual Activity    Alcohol use: No    Drug use: No    Sexual activity: Not Currently     Partners: Male         Family History   Problem Relation Age of Onset    Breast cancer Daughter 43    Breast cancer Maternal Aunt         DX AGE UNKNOWN    Breast cancer Maternal Aunt         DX AGE UNKNOWN    Ovarian cancer Neg Hx   "      Objective  Physical Exam:  /67   Pulse 70   Temp 97.9 °F (36.6 °C) (Oral)   Resp 18   Ht 152.4 cm (60\")   Wt 49.9 kg (110 lb)   LMP  (LMP Unknown)   SpO2 97%   BMI 21.48 kg/m²      Physical Exam  Vitals and nursing note reviewed.   Constitutional:       Appearance: Normal appearance. She is normal weight.   HENT:      Head: Normocephalic and atraumatic.      Nose: Nasal tenderness present. No nasal deformity or septal deviation.      Right Nostril: No epistaxis or septal hematoma.      Left Nostril: No epistaxis or septal hematoma.   Eyes:      Extraocular Movements: Extraocular movements intact.      Pupils: Pupils are equal, round, and reactive to light.      Comments: Periorbital edema and bruising   Neck:      Comments: No vertebral tenderness appreciated  Cardiovascular:      Rate and Rhythm: Normal rate.   Pulmonary:      Effort: Pulmonary effort is normal.   Abdominal:      General: There is no distension.      Palpations: Abdomen is soft.      Tenderness: There is no abdominal tenderness.   Musculoskeletal:         General: Normal range of motion.      Cervical back: Normal range of motion.   Skin:     General: Skin is warm and dry.      Comments: Swelling, ecchymosis noted to left periorbital area, nasal bridge.    Neurological:      General: No focal deficit present.      Mental Status: She is alert and oriented to person, place, and time. Mental status is at baseline.   Psychiatric:         Mood and Affect: Mood normal.         Behavior: Behavior normal.         Thought Content: Thought content normal.         Judgment: Judgment normal.         Procedures    ED Course:         Lab Results (last 24 hours)       ** No results found for the last 24 hours. **             XR Hip With or Without Pelvis 2 - 3 View Right  Result Date: 3/20/2025  XR HIP W OR WO PELVIS 2-3 VIEW RIGHT, XR PELVIS 1 OR 2 VW Date of Exam: 3/20/2025 9:05 PM EDT Indication: fall Comparison: None available. Findings: " Right hip: No acute fracture or dislocation is identified. There is arthritic narrowing of the right hip. Pelvis: No acute fracture or dislocation is identified. There is arthritic narrowing of both hips. There are also degenerative changes in the included lower lumbar spine.     Impression: Impression: 1.No acute fracture or dislocation identified of the right hip. 2.No acute fracture seen on AP view of the pelvis. Electronically Signed: Verónica Marianna  3/20/2025 9:38 PM EDT  Workstation ID: [id number]    XR Pelvis 1 or 2 View  Result Date: 3/20/2025  XR HIP W OR WO PELVIS 2-3 VIEW RIGHT, XR PELVIS 1 OR 2 VW Date of Exam: 3/20/2025 9:05 PM EDT Indication: fall Comparison: None available. Findings: Right hip: No acute fracture or dislocation is identified. There is arthritic narrowing of the right hip. Pelvis: No acute fracture or dislocation is identified. There is arthritic narrowing of both hips. There are also degenerative changes in the included lower lumbar spine.     Impression: Impression: 1.No acute fracture or dislocation identified of the right hip. 2.No acute fracture seen on AP view of the pelvis. Electronically Signed: Verónica Cabral  3/20/2025 9:38 PM EDT  Workstation ID: [id number]    CT Head Without Contrast  Result Date: 3/20/2025  CT HEAD WO CONTRAST, CT FACIAL BONES WO CONTRAST Date of Exam: 3/20/2025 9:07 PM EDT Indication: fall. Comparison: CT head 10/16/2016 Technique: Axial CT images were obtained of the head without contrast administration.  Automated exposure control and iterative construction methods were used. Findings: No large territory infarct. There is no evidence of hemorrhage. No mass effect, edema or midline shift Mild periventricular and subcortical white matter hypodensities, nonspecific but most likely represents chronic small vessel ischemic changes. No extra-axial fluid collection. The ventricular system is normal for age. Status post bilateral lens extraction. Otherwise the orbits  are unremarkable. The paranasal sinuses are clear. The mastoid air cells are clear. The visualized soft tissues are unremarkable. Subtle nondisplaced fracture through the left nasal bone, age indeterminate. The remainder of the osseous structures are intact. Extensive degenerative changes noted throughout the visualized spine     Impression: Impression: 1.No acute intracranial findings. 2.Subtle nondisplaced fracture through the left nasal bone, age indeterminate. Please correlate with point tenderness Electronically Signed: Román Quiñonez DO  3/20/2025 9:30 PM EDT  Workstation ID: [id number]    CT Facial Bones Without Contrast  Result Date: 3/20/2025  CT HEAD WO CONTRAST, CT FACIAL BONES WO CONTRAST Date of Exam: 3/20/2025 9:07 PM EDT Indication: fall. Comparison: CT head 10/16/2016 Technique: Axial CT images were obtained of the head without contrast administration.  Automated exposure control and iterative construction methods were used. Findings: No large territory infarct. There is no evidence of hemorrhage. No mass effect, edema or midline shift Mild periventricular and subcortical white matter hypodensities, nonspecific but most likely represents chronic small vessel ischemic changes. No extra-axial fluid collection. The ventricular system is normal for age. Status post bilateral lens extraction. Otherwise the orbits are unremarkable. The paranasal sinuses are clear. The mastoid air cells are clear. The visualized soft tissues are unremarkable. Subtle nondisplaced fracture through the left nasal bone, age indeterminate. The remainder of the osseous structures are intact. Extensive degenerative changes noted throughout the visualized spine     Impression: Impression: 1.No acute intracranial findings. 2.Subtle nondisplaced fracture through the left nasal bone, age indeterminate. Please correlate with point tenderness Electronically Signed: Román Quiñonez DO  3/20/2025 9:30 PM EDT  Workstation ID:  [id number]         [facility]     Amount and/or Complexity of Data Reviewed  Tests in the radiology section of CPT®: reviewed          DDX: includes but is not limited to: Intracranial hemorrhage, facial bone fracture, contusion, hip fracture    Patient arrives POV with vitals interpreted by myself.     Pertinent features from physical exam: Bruising noted to right hip from a previous fall.  Bruising and swelling around the left eye and nasal bridge.  No septal hematoma.  Epistaxis has resolved prior to arrival.    Diagnostic information from other sources: CT scan significant for a left nondisplaced nasal bone fracture      Medications - No data to display    Results/clinical rationale were discussed with patient, patient's daughter    Advised follow-up with ENT on outpatient basis    -----  ED Disposition       ED Disposition   Discharge    Condition   Stable    Comment   --             Final diagnoses:   Closed nondisplaced fracture of nasal bone, initial encounter   Contusion of hip, unspecified laterality, initial encounter      Your Follow-Up Providers       Eagle Gutierrez MD.    Specialty: Otolaryngology  Follow up details: recheck of symptoms  [address] [id number] [phone]                       Contact information for after-discharge care    Follow-up information has not been specified.                    Your medication list        CONTINUE taking these medications        Instructions Last Dose Given Next Dose Due   aspirin 81 MG EC tablet      Take 1 tablet by mouth Daily.       atorvastatin 40 MG tablet  Commonly known as: LIPITOR      TAKE 1 TABLET BY MOUTH ONCE DAILY AT NIGHT AT BEDTIME       carvedilol 12.5 MG tablet  Commonly known as: COREG      Take 1 tablet by mouth Every 12 (Twelve) Hours.       cyclobenzaprine 10 MG tablet  Commonly known as: FLEXERIL      Take 1 tablet by mouth 3 (Three) Times a Day As Needed for Muscle Spasms.       dicyclomine 10 MG  capsule  Commonly known as: BENTYL      Bentyl 10 mg capsule   Four times a day       furosemide 20 MG tablet  Commonly known as: LASIX      Take 1 tablet by mouth Daily.       glipizide 5 MG tablet  Commonly known as: GLUCOTROL      Take 2 tablets by mouth 2 (Two) Times a Day.       glyburide 2.5 MG tablet  Commonly known as: DIAbeta      glyburide 2.5 mg tablet   Daily       loratadine 10 MG tablet  Commonly known as: CLARITIN      Take 1 tablet by mouth Daily.       losartan 100 MG tablet  Commonly known as: COZAAR      Take 1 tablet by mouth Every Night.       metoprolol succinate XL 25 MG 24 hr tablet  Commonly known as: TOPROL-XL      Take 1 tablet by mouth Daily.       montelukast 10 MG tablet  Commonly known as: SINGULAIR      Take 1 tablet by mouth Daily As Needed.       Multi-Vitamin tablet tablet      Take 1 tablet by mouth Daily.       OneTouch Ultra test strip  Generic drug: glucose blood      USE 1 STRIP ONCE A DAY       onetouch ultrasoft lancets      USE 1 CARTRIDGE NEEDLE ONCE A DAY       pantoprazole 40 MG EC tablet  Commonly known as: PROTONIX      Take 1 tablet by mouth Daily.       SITagliptin-metFORMIN HCl -1000 MG tablet  Commonly known as: JANUMET XR      Take 1 tablet by mouth.       sodium polystyrene powder  Commonly known as: KAYEXALATE           vitamin D3 125 MCG (5000 UT) capsule capsule      Take 1 capsule by mouth Daily.                 76

## 2025-04-01 ENCOUNTER — APPOINTMENT (OUTPATIENT)
Facility: HOSPITAL | Age: OVER 89
End: 2025-04-01
Payer: MEDICARE

## 2025-04-01 ENCOUNTER — HOSPITAL ENCOUNTER (EMERGENCY)
Facility: HOSPITAL | Age: OVER 89
Discharge: HOME | End: 2025-04-01
Attending: STUDENT IN AN ORGANIZED HEALTH CARE EDUCATION/TRAINING PROGRAM | Admitting: STUDENT IN AN ORGANIZED HEALTH CARE EDUCATION/TRAINING PROGRAM
Payer: MEDICARE

## 2025-04-01 VITALS
RESPIRATION RATE: 18 BRPM | HEIGHT: 60 IN | OXYGEN SATURATION: 96 % | WEIGHT: 110 LBS | BODY MASS INDEX: 21.6 KG/M2 | HEART RATE: 72 BPM | TEMPERATURE: 98 F | DIASTOLIC BLOOD PRESSURE: 63 MMHG | SYSTOLIC BLOOD PRESSURE: 165 MMHG

## 2025-04-01 DIAGNOSIS — S22.089A CLOSED FRACTURE OF ELEVENTH THORACIC VERTEBRA, UNSPECIFIED FRACTURE MORPHOLOGY, INITIAL ENCOUNTER: Primary | ICD-10-CM
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
## 2025-04-01 DIAGNOSIS — S30.0XXA CONTUSION OF BUTTOCK, INITIAL ENCOUNTER: ICD-10-CM

## 2025-04-01 PROCEDURE — 72131 CT LUMBAR SPINE W/O DYE: CPT

## 2025-04-01 PROCEDURE — 72128 CT CHEST SPINE W/O DYE: CPT

## 2025-04-01 PROCEDURE — 99284 EMERGENCY DEPT VISIT MOD MDM: CPT | Performed by: STUDENT IN AN ORGANIZED HEALTH CARE EDUCATION/TRAINING PROGRAM

## 2025-04-01 PROCEDURE — 72192 CT PELVIS W/O DYE: CPT

## 2025-04-01 RX ORDER — DOCUSATE SODIUM 100 MG/1
200 CAPSULE, LIQUID FILLED ORAL ONCE
Status: COMPLETED | OUTPATIENT
Start: 2025-04-01 | End: 2025-04-01

## 2025-04-01 RX ORDER — ACETAMINOPHEN 500 MG
1000 TABLET ORAL ONCE
Status: COMPLETED | OUTPATIENT
Start: 2025-04-01 | End: 2025-04-01

## 2025-04-01 RX ADMIN — DOCUSATE SODIUM 200 MG: 100 CAPSULE, LIQUID FILLED ORAL at 15:49

## 2025-04-01 RX ADMIN — ACETAMINOPHEN 1000 MG: 500 TABLET, FILM COATED ORAL at 15:48

## 2025-04-01 NOTE — FS[facility] PROVIDER NOTE
Subjective  History of Present Illness:    Patient is a 89-year-old female who presents to the emergency department today with complaints of back pain in addition to coccygeal pain after sustaining a fall in March 20th.  Patient was originally evaluated in this emergency department on March 20.  Patient had fallen off the last step in the garage and hit her face on a freezer during the fall.  Patient was originally diagnosed with a nondisplaced nasal fracture.  Patient had denied any back pain or injury that was noted to her at that time.  Patient states when she was discharged a few days later she started experiencing lower back pain.  Patient states she has continued to have lower back pain for the past 3 days and has not been unable to sleep due to the pain.  Patient has been taking Aleve and acetaminophen with little to no relief.  Patient has also been using heating pads with no relief.  Patient has been having to sleep sitting upright on the couch due to pain.  Patient denied any loss of bowel or bladder control.  Patient denied any constipation or diarrhea.  Patient denied any pain with urination.  Patient's pain is lower thoracic, lumbar and sacral.  Patient does not have any areas of ecchymosis noted on her back at this time.  Patient normally ambulates with a walker.  Patient states her pain is better with ambulation.  Patient denies any loss of consciousness with the original fall.  Patient denied any head or neck pain at this time.      Nurses Notes reviewed and agree, including vitals, allergies, social history and prior medical history.     REVIEW OF SYSTEMS: All systems reviewed and not pertinent unless noted.  Review of Systems   Constitutional:  Negative for chills, diaphoresis and fever.   HENT:  Negative for ear pain, rhinorrhea and sore throat.    Respiratory:  Negative for cough and shortness of breath.    Cardiovascular:  Negative for chest pain and palpitations.   Gastrointestinal:  Negative  "for abdominal pain, nausea and vomiting.   Genitourinary:  Negative for decreased urine volume, dysuria, flank pain, pelvic pain and urgency.   Musculoskeletal:  Positive for arthralgias, back pain and myalgias.   Skin:  Negative for color change.   Neurological:  Negative for dizziness, syncope, weakness, light-headedness and headaches.   All other systems reviewed and are negative.      Past Medical History:   Diagnosis Date    Corns and callosities     Cough     Diabetes mellitus     Foot pain     Hyperlipidemia     Hypertension     Low back pain     Osteoporosis     Peripheral venous insufficiency     Pneumonia     Toe pain        Allergies:    Tramadol and Codeine      Past Surgical History:   Procedure Laterality Date    BACK SURGERY  05/2013    REDDING    PACEMAKER IMPLANTATION      TUBAL ABDOMINAL LIGATION           Social History     Socioeconomic History    Marital status:    Tobacco Use    Smoking status: Never     Passive exposure: Past   Vaping Use    Vaping status: Never Used   Substance and Sexual Activity    Alcohol use: No    Drug use: No    Sexual activity: Not Currently     Partners: Male         Family History   Problem Relation Age of Onset    Breast cancer Daughter 43    Breast cancer Maternal Aunt         DX AGE UNKNOWN    Breast cancer Maternal Aunt         DX AGE UNKNOWN    Ovarian cancer Neg Hx        Objective  Physical Exam:  /68   Pulse 75   Temp 98 °F (36.7 °C) (Oral)   Resp 18   Ht 152 cm (59.84\")   Wt 49.9 kg (110 lb)   LMP  (LMP Unknown)   SpO2 96%   BMI 21.60 kg/m²      Physical Exam  Vitals and nursing note reviewed.   Constitutional:       General: She is not in acute distress.     Appearance: Normal appearance. She is normal weight. She is not ill-appearing, toxic-appearing or diaphoretic.   HENT:      Head: Normocephalic and atraumatic.      Nose: Nose normal.      Mouth/Throat:      Mouth: Mucous membranes are moist.      Pharynx: Oropharynx is clear. "   Eyes:      Extraocular Movements: Extraocular movements intact.      Conjunctiva/sclera: Conjunctivae normal.      Pupils: Pupils are equal, round, and reactive to light.   Cardiovascular:      Rate and Rhythm: Normal rate and regular rhythm.      Pulses: Normal pulses.      Heart sounds: Murmur heard.   Pulmonary:      Effort: Pulmonary effort is normal.      Breath sounds: Normal breath sounds. No stridor. No wheezing, rhonchi or rales.   Abdominal:      General: Bowel sounds are normal. There is no distension.      Palpations: Abdomen is soft. There is no mass.      Tenderness: There is no abdominal tenderness.   Musculoskeletal:         General: No tenderness. Normal range of motion.      Cervical back: Normal range of motion and neck supple. No tenderness.      Thoracic back: Tenderness present.      Lumbar back: Tenderness present.        Back:       Comments: Tenderness to palpation   Skin:     General: Skin is warm and dry.      Capillary Refill: Capillary refill takes less than 2 seconds.      Findings: No bruising.   Neurological:      General: No focal deficit present.      Mental Status: She is alert and oriented to person, place, and time. Mental status is at baseline.   Psychiatric:         Mood and Affect: Mood normal.         Behavior: Behavior normal.         Thought Content: Thought content normal.         Judgment: Judgment normal.         Procedures    ED Course:         Lab Results (last 24 hours)       ** No results found for the last 24 hours. **             CT Thoracic Spine Without Contrast  Result Date: 4/1/2025  CT PELVIS WO CONTRAST, CT LUMBAR SPINE WO CONTRAST, CT THORACIC SPINE WO CONTRAST Date of Exam: 4/1/2025 2:59 PM EDT Indication: Tailbone pain from fall. Comparison: None available. Technique: Axial CT images were obtained of the thoracic spine, lumbar spine, and pelvis without contrast administration.  Reconstructed coronal and sagittal images were also obtained. Automated  exposure control and iterative construction methods were used. Findings: Thoracic spine: Minimal smooth scoliosis. No traumatic subluxation or significant spondylolisthesis. Subtle cortical irregularity at the anterosuperior endplate of T11 (series 5 image 41, series 4 image 33-36), suspicious for acute minimally displaced superior endplate fracture without significant vertebral body height loss or bony retropulsion. Otherwise no evidence of acute fracture in the thoracic spine. Vertebral body heights are maintained. Paravertebral soft tissues appear unremarkable. No acute or traumatic or suspicious findings in the partially imaged thorax or upper abdomen. Cardiac pacer leads are noted. Severe degenerative disc disease at C7-T1 with chronic subsidence/bony fusion of the C7 vertebral body on the superior T1 vertebral body. Lumbar spine: Dextroconvex scoliosis. Rightward listhesis of L3 on L4 on the basis of severe degenerative disc disease. No traumatic subluxation. Chronic mild to moderate height loss of the superior endplate of L4. The remaining lumbar vertebral body heights appear grossly preserved. No definite acute displaced fracture. Severe degenerative disc disease throughout. There is severe facet arthropathy in the lower lumbar spine. No acute or traumatic or suspicious findings in the partially imaged portions of the posterior abdomen and pelvis. Pelvis: Mild subcutaneous fat stranding at the right gluteal region which may reflect small superficial contusion. No intrapelvic hematoma. No acute displaced fracture. Symmetric mild to moderate degenerative change of the hip joints with chondrocalcinosis of the acetabular nickolas. A pessary is in place.     Impression: Impression: Subtle cortical irregularity at the anterosuperior endplate of T11, suspicious for acute or subacute minimally displaced fracture. No acute fracture or traumatic malalignment of the lumbar spine. No acute displaced fracture of the pelvis.  Scoliosis. Multilevel degenerative changes. Electronically Signed: David Wilson MD  4/1/2025 3:29 PM EDT  Workstation ID: [id number]    CT Lumbar Spine Without Contrast  Result Date: 4/1/2025  CT PELVIS WO CONTRAST, CT LUMBAR SPINE WO CONTRAST, CT THORACIC SPINE WO CONTRAST Date of Exam: 4/1/2025 2:59 PM EDT Indication: Tailbone pain from fall. Comparison: None available. Technique: Axial CT images were obtained of the thoracic spine, lumbar spine, and pelvis without contrast administration.  Reconstructed coronal and sagittal images were also obtained. Automated exposure control and iterative construction methods were used. Findings: Thoracic spine: Minimal smooth scoliosis. No traumatic subluxation or significant spondylolisthesis. Subtle cortical irregularity at the anterosuperior endplate of T11 (series 5 image 41, series 4 image 33-36), suspicious for acute minimally displaced superior endplate fracture without significant vertebral body height loss or bony retropulsion. Otherwise no evidence of acute fracture in the thoracic spine. Vertebral body heights are maintained. Paravertebral soft tissues appear unremarkable. No acute or traumatic or suspicious findings in the partially imaged thorax or upper abdomen. Cardiac pacer leads are noted. Severe degenerative disc disease at C7-T1 with chronic subsidence/bony fusion of the C7 vertebral body on the superior T1 vertebral body. Lumbar spine: Dextroconvex scoliosis. Rightward listhesis of L3 on L4 on the basis of severe degenerative disc disease. No traumatic subluxation. Chronic mild to moderate height loss of the superior endplate of L4. The remaining lumbar vertebral body heights appear grossly preserved. No definite acute displaced fracture. Severe degenerative disc disease throughout. There is severe facet arthropathy in the lower lumbar spine. No acute or traumatic or suspicious findings in the partially imaged portions of the posterior abdomen and pelvis.  Pelvis: Mild subcutaneous fat stranding at the right gluteal region which may reflect small superficial contusion. No intrapelvic hematoma. No acute displaced fracture. Symmetric mild to moderate degenerative change of the hip joints with chondrocalcinosis of the acetabular nickolas. A pessary is in place.     Impression: Impression: Subtle cortical irregularity at the anterosuperior endplate of T11, suspicious for acute or subacute minimally displaced fracture. No acute fracture or traumatic malalignment of the lumbar spine. No acute displaced fracture of the pelvis. Scoliosis. Multilevel degenerative changes. Electronically Signed: David Wilson MD  4/1/2025 3:29 PM EDT  Workstation ID: [id number]    CT Pelvis Without Contrast  Result Date: 4/1/2025  CT PELVIS WO CONTRAST, CT LUMBAR SPINE WO CONTRAST, CT THORACIC SPINE WO CONTRAST Date of Exam: 4/1/2025 2:59 PM EDT Indication: Tailbone pain from fall. Comparison: None available. Technique: Axial CT images were obtained of the thoracic spine, lumbar spine, and pelvis without contrast administration.  Reconstructed coronal and sagittal images were also obtained. Automated exposure control and iterative construction methods were used. Findings: Thoracic spine: Minimal smooth scoliosis. No traumatic subluxation or significant spondylolisthesis. Subtle cortical irregularity at the anterosuperior endplate of T11 (series 5 image 41, series 4 image 33-36), suspicious for acute minimally displaced superior endplate fracture without significant vertebral body height loss or bony retropulsion. Otherwise no evidence of acute fracture in the thoracic spine. Vertebral body heights are maintained. Paravertebral soft tissues appear unremarkable. No acute or traumatic or suspicious findings in the partially imaged thorax or upper abdomen. Cardiac pacer leads are noted. Severe degenerative disc disease at C7-T1 with chronic subsidence/bony fusion of the C7 vertebral body on the  superior T1 vertebral body. Lumbar spine: Dextroconvex scoliosis. Rightward listhesis of L3 on L4 on the basis of severe degenerative disc disease. No traumatic subluxation. Chronic mild to moderate height loss of the superior endplate of L4. The remaining lumbar vertebral body heights appear grossly preserved. No definite acute displaced fracture. Severe degenerative disc disease throughout. There is severe facet arthropathy in the lower lumbar spine. No acute or traumatic or suspicious findings in the partially imaged portions of the posterior abdomen and pelvis. Pelvis: Mild subcutaneous fat stranding at the right gluteal region which may reflect small superficial contusion. No intrapelvic hematoma. No acute displaced fracture. Symmetric mild to moderate degenerative change of the hip joints with chondrocalcinosis of the acetabular nickolas. A pessary is in place.     Impression: Impression: Subtle cortical irregularity at the anterosuperior endplate of T11, suspicious for acute or subacute minimally displaced fracture. No acute fracture or traumatic malalignment of the lumbar spine. No acute displaced fracture of the pelvis. Scoliosis. Multilevel degenerative changes. Electronically Signed: David Wilson MD  4/1/2025 3:29 PM EDT  Workstation ID: [id number]         MDM     Amount and/or Complexity of Data Reviewed  Tests in the radiology section of CPT®: reviewed        Initial impression of presenting illness: Back pain post fall    DDX: includes but is not limited to: Fracture versus sprain versus strain versus contusion    Patient arrives private vehicle with vitals interpreted by myself.     Pertinent features from physical exam: Patient has pain with palpation of her lower thoracic and lumbar spine in addition to the right flank.    Initial diagnostic plan: CT thoracic and lumbar spine, CT pelvis    Results from initial plan were reviewed and interpreted by me revealing patient appears to have an acute versus  subacute minimally displaced anterosuperior endplate fracture of T11    Diagnostic information from other sources: Previous medical records including recent ER visit    Interventions / Re-evaluation: Patient had originally declined pain medication however then requested acetaminophen for pain.    Medications   acetaminophen (TYLENOL) tablet 1,000 mg (1,000 mg Oral Given 4/1/25 1548)   docusate sodium (COLACE) capsule 200 mg (200 mg Oral Given 4/1/25 1549)       Results/clinical rationale were discussed with patient and patient's family    Consultations/Discussion of results with other physicians: Dr. Mcgill    Data interpreted: Nursing notes reviewed, vital signs reviewed.  Imaging independently interpreted by me (CT scan). O2 saturation: 95% on room air    Counseling: Discussed the results above with the patient regarding need for  discharge.  Patient understands and agrees plan of care.      Patient is a 89-year-old female who presents emergency department today with complaints of back pain and coccyx pain after sustaining a fall on March 20.  Patient was originally evaluated in this emergency department and had scans of her head and neck that revealed a nondisplaced nasal fracture.  Patient did not have scans of her back at that time as she was not complaining of back pain.  Patient states she went home that day and experienced back pain over the next few days.  Patient states she has been unable to sleep over the past 3 nights due to significant pain.  Patient has had to sleep sitting upright in a couch.  Patient has been taking acetaminophen and Aleve with little to no relief.  Patient states her pain is better with ambulation.  Patient's physical exam reveals no noted areas of ecchymosis on her back or buttocks.  Given the patient's age and continued pain CT scans were performed.  Patient CT scan reveals an acute minimally displaced fracture of the anterosuperior endplate of T11.  Patient also had mild  subcutaneous fat stranding of the right gluteal region consistent with a superficial contusion.  Patient originally declined any pain medication.  Patient was given acetaminophen in the emergency department for pain control.  Patient scans also showed concerns for constipation therefore she was given Colace in the emergency department.    I am ordering the patient a custom anterior opening TLSO Flexfoam brace. The patient needs a custom brace because she is elderly and frail with severe kyphosis and scoliosis. The brace is for spinal fracture comfort, stabilization and pain control and the patient will wear the brace at all times, out of bed, for the next 3 months. I have contacted [employer] to do a home visit to fit the patient for her custom anterior opening TLSO Flexfoam brace.      Patient will be discharged home in stable condition at this time.  Patient will be given a referral to neurosurgery.  Patient encouraged to continue to rest, ice her back.  Patient encouraged to continue to take acetaminophen and Aleve as needed for pain control.  Patient was informed concerning symptoms that require immediate return to emergency department.  Patient and patient's family voiced understanding of plan of care.    -----  ED Disposition       ED Disposition   Discharge    Condition   Stable    Comment   --             Final diagnoses:   Closed fracture of eleventh thoracic vertebra, unspecified fracture morphology, initial encounter   Contusion of buttock, initial encounter   Fall (on) (from) other stairs and steps, sequela      Your Follow-Up Providers       [facility].    Specialty: Neurosurgery  [address]  [facility] [address] [phone]  Additional information:  Phone Number: [phone]      Located on [facility].  [address]tr[address].  We are the first building on the right, 3rd floor.                     Contact information for  after-discharge care    Follow-up information has not been specified.                    Your medication list        CONTINUE taking these medications        Instructions Last Dose Given Next Dose Due   aspirin 81 MG EC tablet      Take 1 tablet by mouth Daily.       atorvastatin 40 MG tablet  Commonly known as: LIPITOR      TAKE 1 TABLET BY MOUTH ONCE DAILY AT NIGHT AT BEDTIME       carvedilol 12.5 MG tablet  Commonly known as: COREG      Take 1 tablet by mouth Every 12 (Twelve) Hours.       cyclobenzaprine 10 MG tablet  Commonly known as: FLEXERIL      Take 1 tablet by mouth 3 (Three) Times a Day As Needed for Muscle Spasms.       dicyclomine 10 MG capsule  Commonly known as: BENTYL      Bentyl 10 mg capsule   Four times a day       furosemide 20 MG tablet  Commonly known as: LASIX      Take 1 tablet by mouth Daily.       glipizide 5 MG tablet  Commonly known as: GLUCOTROL      Take 2 tablets by mouth 2 (Two) Times a Day.       glyburide 2.5 MG tablet  Commonly known as: DIAbeta      glyburide 2.5 mg tablet   Daily       loratadine 10 MG tablet  Commonly known as: CLARITIN      Take 1 tablet by mouth Daily.       losartan 100 MG tablet  Commonly known as: COZAAR      Take 1 tablet by mouth Every Night.       metoprolol succinate XL 25 MG 24 hr tablet  Commonly known as: TOPROL-XL      Take 1 tablet by mouth Daily.       montelukast 10 MG tablet  Commonly known as: SINGULAIR      Take 1 tablet by mouth Daily As Needed.       Multi-Vitamin tablet tablet      Take 1 tablet by mouth Daily.       OneTouch Ultra test strip  Generic drug: glucose blood      USE 1 STRIP ONCE A DAY       onetouch ultrasoft lancets      USE 1 CARTRIDGE NEEDLE ONCE A DAY       pantoprazole 40 MG EC tablet  Commonly known as: PROTONIX      Take 1 tablet by mouth Daily.       SITagliptin-metFORMIN HCl -1000 MG tablet  Commonly known as: JANUMET XR      Take 1 tablet by mouth.       sodium polystyrene powder  Commonly known as:  KAYEXALATE           vitamin D3 125 MCG (5000 UT) capsule capsule      Take 1 capsule by mouth Daily.

## 2025-04-08 ENCOUNTER — OFFICE VISIT (OUTPATIENT)
Dept: NEUROSURGERY | Facility: CLINIC | Age: OVER 89
End: 2025-04-08
Payer: MEDICARE

## 2025-04-08 VITALS
HEIGHT: 59 IN | BODY MASS INDEX: 22.18 KG/M2 | DIASTOLIC BLOOD PRESSURE: 86 MMHG | WEIGHT: 110 LBS | SYSTOLIC BLOOD PRESSURE: 132 MMHG

## 2025-04-08 DIAGNOSIS — M51.360 DEGENERATION OF INTERVERTEBRAL DISC OF LUMBAR REGION WITH DISCOGENIC BACK PAIN: ICD-10-CM

## 2025-04-08 DIAGNOSIS — M80.00XA AGE-RELATED OSTEOPOROSIS WITH CURRENT PATHOLOGICAL FRACTURE, INITIAL ENCOUNTER: Primary | ICD-10-CM

## 2025-04-08 PROCEDURE — 99204 OFFICE O/P NEW MOD 45 MIN: CPT | Performed by: PHYSICIAN ASSISTANT

## 2025-04-08 RX ORDER — HYDROCODONE BITARTRATE AND ACETAMINOPHEN 7.5; 325 MG/1; MG/1
1 TABLET ORAL
COMMUNITY
Start: 2025-04-02

## 2025-04-08 NOTE — PROGRESS NOTES
Patient: Laverne Cole  : 1933  Chart #: [id number]    Date of Service: 2025    CC: back pain      HPI  Ms Cole is a 89 yo female who had a fall in her garage on 3/20/25 and had multiple lacerations, contusions and a nasal fx. A few days after her fall she started having back pain and her daughter, the nurse, was concerned of a compression fx. She was evaluated in the ED 25, CT concerning for T11 superior endplate fx. She was fitted for a T/L brace which is helpful with her pain. She was sleeping in the chair but with a wedge and Norco at hs she is doing better with her night time pain. At times she does have increased pain during the day. She has been utilizing Tylenol and Motrin prn.    Chronic Illnesses:    Past Medical History:   Diagnosis Date    Corns and callosities     Cough     Diabetes mellitus     Foot pain     Hyperlipidemia     Hypertension     Low back pain     Osteoporosis     Peripheral venous insufficiency     Pneumonia     Thoracic disc disorder     Toe pain          Past Surgical History:   Procedure Laterality Date    BACK SURGERY  2013    REDDING    PACEMAKER IMPLANTATION      TUBAL ABDOMINAL LIGATION         Allergies   Allergen Reactions    Tramadol Nausea And Vomiting     Other reaction(s): Vomiting    Codeine Other (See Comments)     Other reaction(s): Other (See Comments)         Current Outpatient Medications:     aspirin 81 MG EC tablet, Take 1 tablet by mouth Daily., Disp: , Rfl:     atorvastatin (LIPITOR) 40 MG tablet, TAKE 1 TABLET BY MOUTH ONCE DAILY AT NIGHT AT BEDTIME, Disp: , Rfl:     carvedilol (COREG) 12.5 MG tablet, Take 1 tablet by mouth Every 12 (Twelve) Hours., Disp: , Rfl:     Cholecalciferol (VITAMIN D3) 5000 UNITS capsule capsule, Take 1 capsule by mouth Daily., Disp: , Rfl:     cyclobenzaprine (FLEXERIL) 10 MG tablet, Take 1 tablet by mouth 3 (Three) Times a Day As Needed for Muscle Spasms., Disp: , Rfl:     dicyclomine (BENTYL) 10 MG capsule,  Bentyl 10 mg capsule  Four times a day, Disp: , Rfl:     furosemide (LASIX) 20 MG tablet, Take 1 tablet by mouth Daily., Disp: , Rfl:     glipizide (GLUCOTROL) 5 MG tablet, Take 2 tablets by mouth 2 (Two) Times a Day., Disp: , Rfl:     glyburide (DIAbeta) 2.5 MG tablet, glyburide 2.5 mg tablet  Daily, Disp: , Rfl:     HYDROcodone-acetaminophen (NORCO) 7.5-325 MG per tablet, Take 1 tablet by mouth every night at bedtime., Disp: , Rfl:     Lancets (onetouch ultrasoft) lancets, USE 1 CARTRIDGE NEEDLE ONCE A DAY, Disp: , Rfl:     loratadine (CLARITIN) 10 MG tablet, Take 1 tablet by mouth Daily., Disp: , Rfl:     losartan (COZAAR) 100 MG tablet, Take 1 tablet by mouth Every Night., Disp: , Rfl:     metoprolol succinate XL (TOPROL-XL) 25 MG 24 hr tablet, Take 1 tablet by mouth Daily., Disp: , Rfl:     montelukast (SINGULAIR) 10 MG tablet, Take 1 tablet by mouth Daily As Needed., Disp: , Rfl:     Multi-Vitamin tablet tablet, Take 1 tablet by mouth Daily., Disp: , Rfl:     OneTouch Ultra test strip, USE 1 STRIP ONCE A DAY, Disp: , Rfl:     pantoprazole (PROTONIX) 40 MG EC tablet, Take 1 tablet by mouth Daily., Disp: , Rfl:     SITagliptin-metFORMIN HCl ER (JANUMET XR) 100-1000 MG tablet, Take 1 tablet by mouth., Disp: , Rfl:     sodium polystyrene (KAYEXALATE) powder, , Disp: , Rfl:     Social History     Socioeconomic History    Marital status:    Tobacco Use    Smoking status: Never     Passive exposure: Past    Smokeless tobacco: Never   Vaping Use    Vaping status: Never Used   Substance and Sexual Activity    Alcohol use: No    Drug use: No    Sexual activity: Not Currently     Partners: Male       Family History   Problem Relation Age of Onset    Breast cancer Daughter 43    Breast cancer Maternal Aunt         DX AGE UNKNOWN    Breast cancer Maternal Aunt         DX AGE UNKNOWN    Ovarian cancer Neg Hx        BMI is within normal parameters. No other follow-up for BMI required.       Social History    Tobacco  Use      Smoking status: Never        Passive exposure: Past      Smokeless tobacco: Never       Review of Systems   Constitutional:  Negative for activity change, appetite change, chills, diaphoresis, fatigue, fever and unexpected weight change.   HENT:  Negative for congestion, dental problem, drooling, ear discharge, ear pain, facial swelling, hearing loss, mouth sores, nosebleeds, postnasal drip, rhinorrhea, sinus pressure, sinus pain, sneezing, sore throat, tinnitus, trouble swallowing and voice change.    Eyes:  Negative for photophobia, pain, discharge, redness, itching and visual disturbance.   Respiratory:  Negative for apnea, cough, choking, chest tightness, shortness of breath, wheezing and stridor.    Cardiovascular:  Negative for chest pain, palpitations and leg swelling.   Gastrointestinal:  Negative for abdominal distention, abdominal pain, anal bleeding, blood in stool, constipation, diarrhea, nausea, rectal pain and vomiting.   Endocrine: Negative for cold intolerance, heat intolerance, polydipsia, polyphagia and polyuria.   Genitourinary:  Negative for decreased urine volume, difficulty urinating, dysuria, enuresis, flank pain, frequency, genital sores, hematuria and urgency.   Musculoskeletal:  Positive for arthralgias, back pain, gait problem and joint swelling. Negative for myalgias, neck pain and neck stiffness.   Skin:  Negative for color change, pallor, rash and wound.   Allergic/Immunologic: Negative for environmental allergies, food allergies and immunocompromised state.   Neurological:  Negative for dizziness, tremors, seizures, syncope, facial asymmetry, speech difficulty, weakness, light-headedness, numbness and headaches.   Hematological:  Negative for adenopathy. Does not bruise/bleed easily.   Psychiatric/Behavioral:  Negative for agitation, behavioral problems, confusion, decreased concentration, dysphoric mood, hallucinations, self-injury, sleep disturbance and suicidal ideas. The  "patient is not nervous/anxious and is not hyperactive.         Gait & Balance Assessment:  Risk assessment for falls. Fall precautions:  such as;   Using gait aids a cane, walker at the appropriate height at all times for ambulation or if necessary a wheelchair  Removing all area rugs and coffee tables to create a safe environment at home  Ensure clean, dry floors  Wearing supportive footwear and properly fitting clothing  Ensure bed/chair is appropriate height and patient's feet can touch the floor  Using a shower transfer bench  Using walk-in shower and having shower safety bars installed  Ensure proper lighting, minimize glare  Have nightlights operational and in use  Participation in an exercise program for gait training, balance training and strength  Avoid carrying laundry up and down steps  Ensure proper compliance and organization of medications to avoid errors   Avoid use of over the counter sedatives and alcohol consumption  Ensure easy access to call bell, glasses, TV control, telephone  Ensure glasses/hearing aids are in use or close by (on top of night table)     Physical examination:  Blood pressure 132/86, height 149.9 cm (59\"), weight 49.9 kg (110 lb).  HEENT- normocephalic, atraumatic, sclera clear  Lungs-normal expansion, no wheezing  Heart-regular rate and rhythm  Extremities-positive pulses, no edema    Neurological Exam   WDWNWF  A/A/C, speech clear, attention normal, conversant, answers questions appropriately, good historian.  Cranial nerves II through XII are intact.  Motor evaluation does not reveal weakness in the lower extremities.   Sensation is intact.  Gait is normal, balance is normal with her rolling walker.   No tremors are noted.      Radiographic Imaging:  For my review are CT scans, she has pacer/defib.there is minimal changes at the superior endplate of T11. Signicant degenerative disc and facet arthropathy is noted throughout the L/S.    Medical Decision Making  Diagnoses and " all orders for this visit:    1. Age-related osteoporosis with current pathological fracture, initial encounter (Primary)    2. Degeneration of intervertebral disc of lumbar region with discogenic back pain    Patient has been placed on daily Lasix for her CHF and having trouble getting her brace off and clothes down in a timely manner. I have asked that [facility] to re-evaluate her brace and see in there is other options for easier on and off. Patient and daughter are in agreement with this plan. I have asked the daughter to call me with an update on her pain in 2-3 weeks or sooner if there are problems. She seems to be tolerating low dose pain meds that she received from her PCP.     Any copied data from previous notes included in the (1) HPI, (2) PE, (3) MDM and/or assessment and plan has been reviewed and is accurate as of this day.    Catina Cotton, [facility]    Patient Care Team:  Steve Cortes MD as PCP - General (Family Medicine)  Alem Amado MD as Consulting Physician (Orthopedic Surgery)

## 2025-05-05 ENCOUNTER — APPOINTMENT (OUTPATIENT)
Facility: HOSPITAL | Age: OVER 89
End: 2025-05-05
Payer: MEDICARE

## 2025-05-05 ENCOUNTER — HOSPITAL ENCOUNTER (EMERGENCY)
Facility: HOSPITAL | Age: OVER 89
Discharge: HOME | End: 2025-05-05
Attending: EMERGENCY MEDICINE | Admitting: EMERGENCY MEDICINE
Payer: MEDICARE

## 2025-05-05 ENCOUNTER — TELEPHONE (OUTPATIENT)
Dept: NEUROSURGERY | Facility: CLINIC | Age: OVER 89
End: 2025-05-05
Payer: MEDICARE

## 2025-05-05 VITALS
TEMPERATURE: 97.9 F | OXYGEN SATURATION: 96 % | BODY MASS INDEX: 22.18 KG/M2 | RESPIRATION RATE: 16 BRPM | SYSTOLIC BLOOD PRESSURE: 100 MMHG | HEART RATE: 72 BPM | HEIGHT: 59 IN | WEIGHT: 110.01 LBS | DIASTOLIC BLOOD PRESSURE: 53 MMHG

## 2025-05-05 DIAGNOSIS — S76.111A QUADRICEPS STRAIN, RIGHT, INITIAL ENCOUNTER: Primary | ICD-10-CM

## 2025-05-05 PROCEDURE — 73502 X-RAY EXAM HIP UNI 2-3 VIEWS: CPT

## 2025-05-05 PROCEDURE — 99283 EMERGENCY DEPT VISIT LOW MDM: CPT | Performed by: EMERGENCY MEDICINE

## 2025-05-05 NOTE — TELEPHONE ENCOUNTER
Provider:  Lula  Surgery/Procedure:  YUNIOR    Surgery/Procedure Date:    Last visit:   4/8/25  Next visit: NA     Reason for call:  Spoke to Alyson, patient's daughter. She reports that she took Ms. Cole to the ER this morning due to some ongoing right leg pain. She has been doing very well and wearing her back brace. She reports that Ms. Cole has mostly only had pain at night. A few days ago, she was getting into her daughters SUV and felt a little pull in her right leg. Since then she has had intermittent right hamstring pain with change in position. She was in the ER, xray of the hip and right pelvis negative. ER thought it to be a muscle strain - her PCP spoke to her today and asked her to take Flexeril as needed, and refilled Norco.

## 2025-05-05 NOTE — FSED PROVIDER NOTE
Subjective  History of Present Illness:    Arrives to the emergency department with right hip and right hamstring pain.  This occurred approximately 7 to 10 days ago when she was getting into an SUV.  She had her right foot planted on the stool she was getting into the rear passenger seat-she has to articulate her left leg into the vehicle while standing on her right.  She felt something uncomfortable in the back of her leg and hip at the time.  The pain has persisted over the last 7 to 10 days.  She is also wearing a back brace for a compression fracture.  She states she is having less back pain and no radicular symptoms-she has had radicular pain previously.  The pain is worse when she has to keep her leg straight and raise onto the better couch.  No additional falls reported since her fall in March.  She has found improvement with her pain using Tylenol or naproxen.  She is trying not to use naproxen regularly as she has been told to avoid NSAIDs if possible.    Nurses Notes reviewed and agree, including vitals, allergies, social history and prior medical history.     REVIEW OF SYSTEMS: All systems reviewed and not pertinent unless noted.    Past Medical History:   Diagnosis Date    Corns and callosities     Cough     Diabetes mellitus     Foot pain     Hyperlipidemia     Hypertension     Low back pain     Osteoporosis     Peripheral venous insufficiency     Pneumonia     Thoracic disc disorder     Toe pain        Allergies:    Tramadol and Codeine      Past Surgical History:   Procedure Laterality Date    BACK SURGERY  05/2013    REDDING    PACEMAKER IMPLANTATION      TUBAL ABDOMINAL LIGATION           Social History     Socioeconomic History    Marital status:    Tobacco Use    Smoking status: Never     Passive exposure: Past    Smokeless tobacco: Never   Vaping Use    Vaping status: Never Used   Substance and Sexual Activity    Alcohol use: No    Drug use: No    Sexual activity: Not Currently      "Partners: Male         Family History   Problem Relation Age of Onset    Breast cancer Daughter 43    Breast cancer Maternal Aunt         DX AGE UNKNOWN    Breast cancer Maternal Aunt         DX AGE UNKNOWN    Ovarian cancer Neg Hx        Objective  Physical Exam:  /53   Pulse 72   Temp 97.9 °F (36.6 °C) (Oral)   Resp 16   Ht 149.9 cm (59.02\")   Wt 49.9 kg (110 lb 0.2 oz)   LMP  (LMP Unknown)   SpO2 96%   BMI 22.21 kg/m²      Physical Exam    Primary Survey    Airway: Patent and protected  Breathing: Symmetric bilaterally  Circulation: Mentating well, responsive        Constitutional: Nontoxic appearance.  Psychological: No abnormalities of mood affect.  Head: Atraumatic  Eyes: Conjunctiva are non-injected. no scleral icterus.  ENT: No obvious congestion or obstruction noted  Neck: No obvious deformity.  ROM appears preserved  Chest: No deformity noted.  No paradoxical breathing noted  Respiratory: Respiratory effort was normal - no use of accessory respiratory muscles noted.  There is no stridor.  Cardiovascular: Perfusion appears preserved - mentating well RRR no murmurs  Gastrointestinal: Abdomen nondistended.  Genitourinary: Not examined  Lymphatic: Not examined  Back: Not examined  Musculoskeletal: Musculoskeletal system is grossly intact.  There is no obvious deformity.  Full range of motion of the right hip knee and ankle, neurovascular intact distally, no appreciable edema or asymmetry.  No crepitus with palpation when locating the anatomical area of the patient's discomfort she points to the mid the proximal hamstring muscles.  No significant tenderness to the quadricep or hamstring muscles  Neurological: Face: No Asymmetry.  Gross motor movement is intact in all 4 extremities.   Patient exhibits normal speech.  Skin: No Pallor no obvious bruising.  No obvious rash.      ED Course:      Lab Results (last 24 hours)       ** No results found for the last 24 hours. **             XR Hip With or " Without Pelvis 2 - 3 View Right  Result Date: 5/5/2025  XR HIP W OR WO PELVIS 2-3 VIEW RIGHT Date of Exam: 5/5/2025 8:10 AM EDT Indication: Right hip pain Comparison: 3/20/2025. Findings: The bony structures are demineralized. There is no acute fracture or dislocation. There is narrowing and spurring of both hip joints, the sacroiliac joints right greater than left side, and throughout the visualized lower lumbar spine. There is dextroscoliosis of the lumbar spine with suggestion of a L4 kyphoplasty. There are scattered atherosclerotic vascular calcifications.     Impression: Impression: 1.No acute fracture or dislocation. 2.Bony demineralization. 3.Degenerative changes. Electronically Signed: Jacinto Ramos MD  5/5/2025 8:29 AM EDT  Workstation ID: [id number]       No orders to display       Procedures    MDM      Initial impression of presenting illness : 81-year-old female with 10 days of intermittent pain in her right hamstring and hip.  Low suspicion for fracture but she does have osteoporosis.  Will obtain x-ray.  Highly suspect she strained her right hamstring based on the mechanism and the exacerbating factors that have persisted.  No suspicion for DVT or arterial occlusion based on exam.    any diagnostic and therapeutic plan was ordered and interpreted by VINEET Montoya MD with emphasis on identifying and treating emergent/urgent morbid conditions likely associated with the differential diagnosis with this type of presentation.    ED Course as of 05/05/25 0837   Mon May 05, 2025   0836 X-rays nonactionable.  Highly suspect the patient is strained her quadricep muscle based on mechanism.  Discussed anti-inflammatory use.  Will recommend Voltaren.  Will discharge [KLAUDIA]      ED Course User Index  [KLAUDIA] Maykel Montoya MD        Medications - No data to display    HEART SCORE   No data recorded           -----  ED Disposition       ED Disposition   Discharge    Condition   Stable    Comment   --             Final  diagnoses:   Quadriceps strain, right, initial encounter      Your Follow-Up Providers       Schedule an appointment as soon as possible for a visit  with Steve Cortes MD.    Specialty: Family Medicine  Follow up details: As needed  [address] [address] [phone]                       Contact information for after-discharge care    Follow-up information has not been specified.                    Your medication list        START taking these medications        Instructions Last Dose Given Next Dose Due   Diclofenac Sodium 1 % gel gel  Commonly known as: VOLTAREN      Apply 4 g topically to the appropriate area as directed 4 (Four) Times a Day As Needed (right quadricep pain).              CONTINUE taking these medications        Instructions Last Dose Given Next Dose Due   aspirin 81 MG EC tablet      Take 1 tablet by mouth Daily.       atorvastatin 40 MG tablet  Commonly known as: LIPITOR      TAKE 1 TABLET BY MOUTH ONCE DAILY AT NIGHT AT BEDTIME       carvedilol 12.5 MG tablet  Commonly known as: COREG      Take 1 tablet by mouth Every 12 (Twelve) Hours.       cyclobenzaprine 10 MG tablet  Commonly known as: FLEXERIL      Take 1 tablet by mouth 3 (Three) Times a Day As Needed for Muscle Spasms.       dicyclomine 10 MG capsule  Commonly known as: BENTYL      Bentyl 10 mg capsule   Four times a day       furosemide 20 MG tablet  Commonly known as: LASIX      Take 1 tablet by mouth Daily.       glipizide 5 MG tablet  Commonly known as: GLUCOTROL      Take 2 tablets by mouth 2 (Two) Times a Day.       glyburide 2.5 MG tablet  Commonly known as: DIAbeta      glyburide 2.5 mg tablet   Daily       HYDROcodone-acetaminophen 7.5-325 MG per tablet  Commonly known as: NORCO      Take 1 tablet by mouth every night at bedtime.       loratadine 10 MG tablet  Commonly known as: CLARITIN      Take 1 tablet by mouth Daily.       losartan 100 MG tablet  Commonly known as: COZAAR      Take 1 tablet by mouth  Every Night.       metoprolol succinate XL 25 MG 24 hr tablet  Commonly known as: TOPROL-XL      Take 1 tablet by mouth Daily.       montelukast 10 MG tablet  Commonly known as: SINGULAIR      Take 1 tablet by mouth Daily As Needed.       Multi-Vitamin tablet tablet      Take 1 tablet by mouth Daily.       OneTouch Ultra test strip  Generic drug: glucose blood      USE 1 STRIP ONCE A DAY       onetouch ultrasoft lancets      USE 1 CARTRIDGE NEEDLE ONCE A DAY       pantoprazole 40 MG EC tablet  Commonly known as: PROTONIX      Take 1 tablet by mouth Daily.       SITaglip Phos-metFORMIN HCl -1000 MG tablet  Commonly known as: JANUMET XR      Take 1 tablet by mouth.       sodium polystyrene powder  Commonly known as: KAYEXALATE           vitamin D3 125 MCG (5000 UT) capsule capsule      Take 1 capsule by mouth Daily.                 Where to Get Your Medications        These medications were sent to [facility]/pharmacy #[id number] - [place], [place] - [address] [facility] - [phone]  - [phone]   [address] [facility] [address]      Phone: [phone]   Diclofenac Sodium 1 % gel gel

## 2025-05-09 ENCOUNTER — TELEPHONE (OUTPATIENT)
Dept: NEUROSURGERY | Facility: CLINIC | Age: OVER 89
End: 2025-05-09
Payer: MEDICARE

## 2025-05-09 NOTE — TELEPHONE ENCOUNTER
Caller: PAYTON     Relationship to patient: DAUGHTER    Best call back number: [phone]    Chief complaint: INTENSE RIGHT LEG PAIN     Type of visit: F/U     Requested date: ASAP    Additional notes: PT'S DAUGHTER CALLED AND STATES HER MOTHER HAS BEEN CONCERNED ABOUT THE INCREASED PAIN IN HER RIGHT LEG - SHE STATES SHE HAD A PAST SX WITH DR REDDING AND THE ISSUE STARTED WITH LEFT LEG PAIN - PT HAS BEEN SEEN IN THE ER AND F/U WITH PRIMARY CARE AS WELL AS FOLLOWED THE INSTRUCTIONS FROM HER PREV VISIT TO MANAGE HER BACK PAIN FROM HER COMPRESSION FRACTURE WHICH SEEMS TO BE HEALING WELL - PT WOULD LIKE TO F/U IN REGARDS TO THE LEG PAIN FOR PEACE OF MIND AND TO MAKE SURE IT ISN'T CONNECTED TO THE BACK ISSUE - PT WOULD LIKE A CALL BACK TO ADVISE OKAY TO LVM IF NO ANSWER

## 2025-05-12 NOTE — TELEPHONE ENCOUNTER
Provider:  Lula  Surgery/Procedure:  YUNIOR    Surgery/Procedure Date:    Last visit:   4/8/25  Next visit: YUNIOR     Reason for call: Spoke to her daughter today. She says that her pain is not any different, but she does continue to have intermittent pain in her right leg. She is weight bearing and not weak, but is ambulating with a walker. She would like to follow up in office to be sure this is not related to her prior back issues. She does utilize a TLSO brace, Flexeril 5mg TID PRN, and Hydrocodone 7.5 mg HS. During the day she does utilized Aleve and Tylenol.

## 2025-05-14 NOTE — TELEPHONE ENCOUNTER
PATIENTS DAUGHTER IN LAW CALLED BACK STATING SHE HAS NOT RECEIVED A CALL BACK ABOUT THE ISSUE THAT SHE'S BEEN HAVING WITH HER LEG - STATES IT HAS NOT GOTTEN WORST BUT HAS NOT GOTTEN BETTER EITHER  WANTS TO KNOW IF SHE IS NEEDING TO COME BACK IN TO SEEN     PLEASE ADVISE  THANK YOU     [phone]

## 2025-05-16 ENCOUNTER — DOCUMENTATION (OUTPATIENT)
Dept: NEUROSURGERY | Facility: CLINIC | Age: OVER 89
End: 2025-05-16

## 2025-05-16 ENCOUNTER — HOSPITAL ENCOUNTER (EMERGENCY)
Facility: HOSPITAL | Age: OVER 89
Discharge: HOME | End: 2025-05-16
Attending: EMERGENCY MEDICINE
Payer: MEDICARE

## 2025-05-16 ENCOUNTER — APPOINTMENT (OUTPATIENT)
Dept: CT IMAGING | Facility: HOSPITAL | Age: OVER 89
End: 2025-05-16
Payer: MEDICARE

## 2025-05-16 VITALS
BODY MASS INDEX: 22.18 KG/M2 | HEART RATE: 71 BPM | SYSTOLIC BLOOD PRESSURE: 165 MMHG | HEIGHT: 59 IN | DIASTOLIC BLOOD PRESSURE: 70 MMHG | RESPIRATION RATE: 18 BRPM | TEMPERATURE: 98.1 F | WEIGHT: 110 LBS | OXYGEN SATURATION: 95 %

## 2025-05-16 DIAGNOSIS — M54.16 LUMBAR RADICULOPATHY: Primary | ICD-10-CM

## 2025-05-16 DIAGNOSIS — M54.17 LUMBOSACRAL RADICULOPATHY AT L3: Primary | ICD-10-CM

## 2025-05-16 DIAGNOSIS — M48.061 SPINAL STENOSIS OF LUMBAR REGION, UNSPECIFIED WHETHER NEUROGENIC CLAUDICATION PRESENT: ICD-10-CM

## 2025-05-16 DIAGNOSIS — M51.360 DEGENERATION OF INTERVERTEBRAL DISC OF LUMBAR REGION WITH DISCOGENIC BACK PAIN: ICD-10-CM

## 2025-05-16 PROCEDURE — 99212 OFFICE O/P EST SF 10 MIN: CPT | Performed by: PHYSICIAN ASSISTANT

## 2025-05-16 PROCEDURE — 72131 CT LUMBAR SPINE W/O DYE: CPT

## 2025-05-16 PROCEDURE — 99284 EMERGENCY DEPT VISIT MOD MDM: CPT

## 2025-05-16 RX ORDER — HYDROCODONE BITARTRATE AND ACETAMINOPHEN 5; 325 MG/1; MG/1
1 TABLET ORAL ONCE
Refills: 0 | Status: COMPLETED | OUTPATIENT
Start: 2025-05-16 | End: 2025-05-16

## 2025-05-16 RX ORDER — HYDROCODONE BITARTRATE AND ACETAMINOPHEN 5; 325 MG/1; MG/1
1 TABLET ORAL EVERY 6 HOURS PRN
Qty: 10 TABLET | Refills: 0 | Status: SHIPPED | OUTPATIENT
Start: 2025-05-16 | End: 2025-05-21

## 2025-05-16 RX ORDER — PREGABALIN 25 MG/1
25 CAPSULE ORAL 3 TIMES DAILY
Qty: 30 CAPSULE | Refills: 0 | Status: SHIPPED | OUTPATIENT
Start: 2025-05-16

## 2025-05-16 RX ADMIN — HYDROCODONE BITARTRATE AND ACETAMINOPHEN 1 TABLET: 5; 325 TABLET ORAL at 12:19

## 2025-05-16 NOTE — PROGRESS NOTES
Laverne Cole  8/30/1933    Ms. Cole's daughter and caregiver, Alyson, reports that Ms. Pickering has been having significant pain radiating into the right Hip and anterior thigh to the knee since 1 May.  She did take a fall in March and had hip and pelvis x-rays as well as CT of the head and CT facial bones she fell again April 1 and landed on her tailbone and underwent CT of the thoracic lumbar and pelvis.  I have treated her with lumbar bracing for compression fracture of L4.  She was stepping up on a stepstool to get in a vehicle early May and developed right hip pain, she was seen in the emergency department in [place] on 5/5/2025 with hip x-ray which was unrevealing for fractures.  Her symptoms have progressively gotten worse over this past week or so and the daughter called the office and is taking her to the main [facility] emergency room for reevaluation.  I did talk with the daughter and I do agree that that is probably the best route of evaluation today.        Jaelyn Cotton PA-C

## 2025-05-16 NOTE — TELEPHONE ENCOUNTER
CALLER:  PAYTON    RELATIONSHIP:  DAUGHTER    CONTACT NUMBER:  [phone]    CALL REGARDING:  PAYTON PATIENTS DAUGHTER ([facility] [employer]) CALLED.  STATES THE PATIENTS PAIN IS WORSENING.  STATES SHE HAS MADE SEVERAL CALLS AND HAS NOT HEARD ANYTHING BACK.  STATES THIS MORNING WHEN THE PATIENT GOT UP SHE IS NOW NON WEIGHT BARING OF HER RIGHT LEG AND CRYING IN PAIN.  PAYTON WOULD LIKE TO KNOW WHAT SHE SHOULD DO.      DUE TO THE PATIENT BEING NON WEIGHT BARING OF RIGHT LEG AND HAVING CALLED SEVERAL TIMES I AM TRANSFERRING PAYTON TO THE PRACTICE.     THANK YOU

## 2025-05-16 NOTE — ED PROVIDER NOTES
Subjective   History of Present Illness    Pt presents with R leg pain that began several days ago and is worse today.  She had a fall back in march with back pain and hip pain and had imaging at that time showing a T11 fracture.  She saw NS and has a brace that she is compliant with.  Last few days she has been having pain in the leg and she draws a line from the posterior inferior aspect of buttock around to the medial left knee.  Worse with walking/weight bearing.  There has been no new injury.  Daughter says she called NS office and was directed to the ED.  Daughter says patient also laid on a heating pad the other day and now has some lesions on buttocks she is worried about.    History provided by:  Patient and relative      Review of Systems    Past Medical History:   Diagnosis Date    Corns and callosities     Cough     Diabetes mellitus     Foot pain     Hyperlipidemia     Hypertension     Low back pain     Osteoporosis     Peripheral venous insufficiency     Pneumonia     Thoracic disc disorder     Toe pain        Allergies   Allergen Reactions    Tramadol Nausea And Vomiting     Other reaction(s): Vomiting    Codeine Other (See Comments)     Other reaction(s): Other (See Comments)       Past Surgical History:   Procedure Laterality Date    BACK SURGERY  05/2013    REDDING    PACEMAKER IMPLANTATION      TUBAL ABDOMINAL LIGATION         Family History   Problem Relation Age of Onset    Breast cancer Daughter 43    Breast cancer Maternal Aunt         DX AGE UNKNOWN    Breast cancer Maternal Aunt         DX AGE UNKNOWN    Ovarian cancer Neg Hx        Social History     Socioeconomic History    Marital status:    Tobacco Use    Smoking status: Never     Passive exposure: Past    Smokeless tobacco: Never   Vaping Use    Vaping status: Never Used   Substance and Sexual Activity    Alcohol use: No    Drug use: No    Sexual activity: Not Currently     Partners: Male           Objective   Physical  Exam  Vitals and nursing note reviewed.   Constitutional:       General: She is not in acute distress.     Appearance: Normal appearance. She is not ill-appearing.   HENT:      Head: Normocephalic and atraumatic.   Eyes:      General: No scleral icterus.        Right eye: No discharge.         Left eye: No discharge.      Conjunctiva/sclera: Conjunctivae normal.   Cardiovascular:      Rate and Rhythm: Normal rate.   Pulmonary:      Effort: Pulmonary effort is normal. No respiratory distress.   Musculoskeletal:         General: No swelling or deformity.      Cervical back: Normal range of motion and neck supple.   Skin:     General: Skin is dry.      Findings: No rash.      Comments: Mild erythema to both buttocks superiorly consistent with superficial burn or early pressure injury. No infection seen, lesions are not vesicular.   Neurological:      General: No focal deficit present.      Mental Status: She is alert. Mental status is at baseline.   Psychiatric:         Mood and Affect: Mood normal.         Behavior: Behavior normal.         Thought Content: Thought content normal.         Procedures           ED Course    I reviewed outside records.  CT pelvis 4/1/25 no hip fracture.  CT LS spine 4/1/25 T11 fracture.      CT LS spine read by me degenerative disease, no acute process.    Better with meds.    Spoke with YESSY Cotton, Catina will get her set up for injection.    Patient stable on serial rechecks.  Discussed findings, concerns, plan of care, expected course, reasons to return and followup.  Provided the opportunity to ask questions.                                             MICHELLE reviewed by Adilson Bowden MD       Medical Decision Making  Problems Addressed:  Lumbosacral radiculopathy at L3: complicated acute illness or injury    Amount and/or Complexity of Data Reviewed  Independent Historian: caregiver  External Data Reviewed: radiology.  Radiology: ordered and independent interpretation  performed. Decision-making details documented in ED Course.    Risk  Prescription drug management.  Decision regarding hospitalization.        Final diagnoses:   Lumbosacral radiculopathy at L3       ED Disposition  ED Disposition       ED Disposition   Discharge    Condition   Stable    Comment   --               Jaelyn Cotton PA-C  [address]  [address]  [place] [address]  [phone]    Call            Medication List        Changed      * HYDROcodone-acetaminophen 7.5-325 MG per tablet  Commonly known as: NORCO  What changed: Another medication with the same name was added. Make sure you understand how and when to take each.     * HYDROcodone-acetaminophen 5-325 MG per tablet  Commonly known as: NORCO  Take 1 tablet by mouth Every 6 (Six) Hours As Needed for Moderate Pain.  What changed: You were already taking a medication with the same name, and this prescription was added. Make sure you understand how and when to take each.           * This list has 2 medication(s) that are the same as other medications prescribed for you. Read the directions carefully, and ask your doctor or other care provider to review them with you.                   Where to Get Your Medications        These medications were sent to [facility]/pharmacy #[id number] - [place], [place] - [address] [facility] [address] - [phone] [facility] [phone]   [address] [id number]      Phone: [phone]   HYDROcodone-acetaminophen 5-325 MG per tablet            Adilson Bowden MD  05/16/25 5397

## 2025-05-21 ENCOUNTER — OFFICE VISIT (OUTPATIENT)
Dept: PAIN MEDICINE | Facility: CLINIC | Age: OVER 89
End: 2025-05-21
Payer: MEDICARE

## 2025-05-21 ENCOUNTER — OFFICE VISIT (OUTPATIENT)
Dept: NEUROSURGERY | Facility: CLINIC | Age: OVER 89
End: 2025-05-21
Payer: MEDICARE

## 2025-05-21 VITALS — BODY MASS INDEX: 22.18 KG/M2 | HEIGHT: 59 IN | TEMPERATURE: 98.6 F | WEIGHT: 110 LBS

## 2025-05-21 VITALS — WEIGHT: 110 LBS | BODY MASS INDEX: 22.18 KG/M2 | HEIGHT: 59 IN

## 2025-05-21 DIAGNOSIS — M79.651 ACUTE PAIN OF RIGHT THIGH: ICD-10-CM

## 2025-05-21 DIAGNOSIS — M47.817 LUMBOSACRAL SPONDYLOSIS WITHOUT MYELOPATHY: ICD-10-CM

## 2025-05-21 DIAGNOSIS — T14.8XXA INJURY OF MUSCULOSKELETAL SYSTEM: Primary | ICD-10-CM

## 2025-05-21 DIAGNOSIS — G89.4 CHRONIC PAIN SYNDROME: ICD-10-CM

## 2025-05-21 DIAGNOSIS — M25.551 ACUTE RIGHT HIP PAIN: ICD-10-CM

## 2025-05-21 DIAGNOSIS — S76.011A HIP STRAIN, RIGHT, INITIAL ENCOUNTER: Primary | ICD-10-CM

## 2025-05-21 DIAGNOSIS — M51.362 DEGENERATION OF INTERVERTEBRAL DISC OF LUMBAR REGION WITH DISCOGENIC BACK PAIN AND LOWER EXTREMITY PAIN: ICD-10-CM

## 2025-05-21 RX ORDER — HYDROCODONE BITARTRATE AND ACETAMINOPHEN 7.5; 325 MG/1; MG/1
1 TABLET ORAL 2 TIMES DAILY
Qty: 60 TABLET | Refills: 0 | Status: SHIPPED | OUTPATIENT
Start: 2025-05-21

## 2025-05-21 NOTE — PROGRESS NOTES
Referring Physician: No referring provider defined for this encounter.    Primary Physician: Steve Cortes MD    CHIEF COMPLAINT or REASON FOR VISIT: Leg Pain (New patient)      Initial history of present illness on 05/21/2025:  Ms. Laverne Cole is 89 y.o. female who presents as a new patient referral for evaluation and treatment of a 2-week history of right thigh pain.  Patient has had recent difficulties with a fall in March subsequent which she had been complaining of back pain.  She has seen neurosurgery for concern regarding a possible new T1 compression fracture.  She is currently wearing a TLSO and is not significantly complaining of back pain.  Unfortunately 2 weeks ago on approximately 5/5/2025 she developed new onset right medial thigh pain exacerbated with thigh flexion.  She has bruising on her inner thigh and swelling of the right foot which is new.  She does not recall a specific antecedent trauma.  She denies any numbness or tingling.  She does have weakness with thigh flexion.  She has been evaluated by Catina Cotton PA-C who referred for further evaluation.     Interval history:    Interventions:      Objective Pain Scoring:   BRIEF PAIN INVENTORY:  Total score:   Pain Score    05/21/25 1145   PainSc: 6    PainLoc: Leg      PHQ-2: 1  PHQ-9:    Opioid Risk Tool:         Review of Systems:   ROS negative except as otherwise noted     Past Medical History:   Past Medical History:   Diagnosis Date    Corns and callosities     Cough     Diabetes mellitus     Foot pain     Hyperlipidemia     Hypertension     Low back pain     Osteoporosis     Peripheral venous insufficiency     Pneumonia     Thoracic disc disorder     Toe pain          Past Surgical History:   Past Surgical History:   Procedure Laterality Date    BACK SURGERY  05/2013    REDDING    PACEMAKER IMPLANTATION      TUBAL ABDOMINAL LIGATION           Family History   Family History   Problem Relation Age of Onset    Breast cancer  Daughter 43    Breast cancer Maternal Aunt         DX AGE UNKNOWN    Breast cancer Maternal Aunt         DX AGE UNKNOWN    Ovarian cancer Neg Hx          Social History   Social History     Socioeconomic History    Marital status:    Tobacco Use    Smoking status: Never     Passive exposure: Past    Smokeless tobacco: Never   Vaping Use    Vaping status: Never Used   Substance and Sexual Activity    Alcohol use: No    Drug use: No    Sexual activity: Not Currently     Partners: Male        Medications:     Current Outpatient Medications:     aspirin 81 MG EC tablet, Take 1 tablet by mouth Daily., Disp: , Rfl:     atorvastatin (LIPITOR) 40 MG tablet, TAKE 1 TABLET BY MOUTH ONCE DAILY AT NIGHT AT BEDTIME, Disp: , Rfl:     carvedilol (COREG) 12.5 MG tablet, Take 1 tablet by mouth Every 12 (Twelve) Hours., Disp: , Rfl:     Cholecalciferol (VITAMIN D3) 5000 UNITS capsule capsule, Take 1 capsule by mouth Daily., Disp: , Rfl:     cyclobenzaprine (FLEXERIL) 10 MG tablet, Take 1 tablet by mouth 3 (Three) Times a Day As Needed for Muscle Spasms., Disp: , Rfl:     Diclofenac Sodium (VOLTAREN) 1 % gel gel, Apply 4 g topically to the appropriate area as directed 4 (Four) Times a Day As Needed (right quadricep pain)., Disp: 50 g, Rfl: 0    dicyclomine (BENTYL) 10 MG capsule, Bentyl 10 mg capsule  Four times a day, Disp: , Rfl:     furosemide (LASIX) 20 MG tablet, Take 1 tablet by mouth Daily., Disp: , Rfl:     glipizide (GLUCOTROL) 5 MG tablet, Take 2 tablets by mouth 2 (Two) Times a Day., Disp: , Rfl:     glyburide (DIAbeta) 2.5 MG tablet, glyburide 2.5 mg tablet  Daily, Disp: , Rfl:     Lancets (onetouch ultrasoft) lancets, USE 1 CARTRIDGE NEEDLE ONCE A DAY, Disp: , Rfl:     loratadine (CLARITIN) 10 MG tablet, Take 1 tablet by mouth Daily., Disp: , Rfl:     losartan (COZAAR) 100 MG tablet, Take 1 tablet by mouth Every Night., Disp: , Rfl:     metoprolol succinate XL (TOPROL-XL) 25 MG 24 hr tablet, Take 1 tablet by mouth  "Daily., Disp: , Rfl:     montelukast (SINGULAIR) 10 MG tablet, Take 1 tablet by mouth Daily As Needed., Disp: , Rfl:     Multi-Vitamin tablet tablet, Take 1 tablet by mouth Daily., Disp: , Rfl:     OneTouch Ultra test strip, USE 1 STRIP ONCE A DAY, Disp: , Rfl:     pantoprazole (PROTONIX) 40 MG EC tablet, Take 1 tablet by mouth Daily., Disp: , Rfl:     pregabalin (Lyrica) 25 MG capsule, Take 1 capsule by mouth 3 (Three) Times a Day., Disp: 30 capsule, Rfl: 0    SITagliptin-metFORMIN HCl ER (JANUMET XR) 100-1000 MG tablet, Take 1 tablet by mouth., Disp: , Rfl:     sodium polystyrene (KAYEXALATE) powder, , Disp: , Rfl:         Physical Exam:     Vitals:    05/21/25 1145   Weight: 49.9 kg (110 lb)   Height: 149.9 cm (59\")   PainSc: 6    PainLoc: Leg        General: Alert and oriented, No acute distress.   HEENT: Normocephalic, atraumatic.   Cardiovascular: 2+ pitting edema in the right foot  Respiratory: Respirations are non-labored  There is ecchymosis on the medial thigh  Motor Exam:    Strength: Rate on 1-5 scale Right Left    L1/2- hip flexion 3/5  5/5    L3- knee extension 4/5  5/5    L4- ankle dorsiflexion 5/5  5/5    L5- great toe extension 5/5  5/5    S1- ankle plantarflexion 5/5  5/5    Sensory Exam: Full and equal sensation to light touch throughout.       Neurologic: Cranial Nerves II-XII are grossly intact.     Psychiatric: Cooperative.   Gait: rolling walker   Assistive Devices: antalgic    Imaging Studies:   No results found for this or any previous visit.        Independent review of radiographic imaging:     Impression & Plan:       05/21/2025: Laverne Cole is a 89 y.o. female with past medical history significant for osteoporosis, HTN, HLD, diabetes, chronic shoulder pain, T1 compression fracture, L4 compression fracture who presents to the pain clinic for evaluation and treatment of right thigh pain.  Evaluation is consistent with musculoskeletal strain/injury of right hip flexors.  My expectation " is that she improves with time; we will palliate with Norco in the meantime and have her see orthopedics with Dr. Boucher.    1. Hip strain, right, initial encounter    2. Chronic pain syndrome    3. Lumbosacral spondylosis without myelopathy        PLAN:  1. Medications:    - Start Norco 7.5 mg twice a day 60 pills no refills  -As part of this patient's treatment plan, patient may be prescribed controlled substances. The patient has been made aware of appropriate use of such medications, including potential risk of somnolence, limited ability to drive and /or work safely, and potential for dependence or overdose. It has also been made clear that these medications are for use by this patient only, without concomitant use of alcohol or other substances unless prescribed. Controlled substance status of medication discussed with patient, discussed risks of medication including abuse potential and diversion potential and need to follow up for reevaluation appointment in order to receive further refills.  Yoan was reviewed and compliant.    2. Physical Therapy: Defer to Ortho    3. Psychological: defer    4. Complementary and alternative (CAM) Therapies:     5. Labs/Diagnostic studies: None indicated     6. Imaging: Defer to Ortho    7. Interventions: None indicated     8. Referrals: Orthopedics    9. Records: I personally discussed the case and saw the patient with Catina Cotton PA-C    10. Lifestyle goals:    Follow-up 1 month      [facility]  Zev Waters MD          Quality Metrics:                Please note that portions of this note were completed with a voice recognition program.      Any copied data in any portion of my note from previous notes included in the HPI, PE, MDM and/or assessment and plan has been reviewed by myself and accurate as of this date.      The 21st Century Cures Act makes medical notes like this available to patients in the interest of transparency.  This is a medical document intended as peer to peer communication. It is written in medical language and may contain abbreviations or verbiage that are unfamiliar. It may appear blunt or direct. Medical documents are intended to carry relevant information, facts as evident, and the clinical opinion of the practitioner.

## 2025-05-21 NOTE — PROGRESS NOTES
Patient: Laverne Cole  : 1933  Chart #: [id number]    Date of Service: 2025    CC: back pain, right hip, right groin and thigh pain      Leg Pain   Pertinent negatives include no numbness.   Back Pain  Associated symptoms: leg pain    Associated symptoms: no abdominal pain, no chest pain, no dysuria, no fever, no headaches, no numbness, no pelvic pain and no weakness      Ms Cole is a 89 yo female who had a fall in her garage on 3/20/25 and had multiple lacerations, contusions and a nasal fx. A few days after her fall she started having back pain and her daughter, the nurse, was concerned of a compression fx. She was evaluated in the ED 25, CT concerning for T11 superior endplate fx. She was fitted for a T/L brace which is helpful with her pain.    The first week of May she was doing okay until she was getting in her daughter's SUV and 2 to 3 days later she developed right hip pain.  Pain will radiate into the right lower back to the buttock into the groin, she does endorse pain to just below the knee.  She has been seen in the emergency room and she has undergone repeat CT scan of the lumbar spine, x-rays of the hips, CT pelvis, CT hips and has got findings of osteoarthritis but her symptoms persist.  On one ED visit the provider felt she may have pulled muscles in her hamstrings.  She has worse pain when she is trying to get up and walk, she still has pain when she is sitting, it is affecting her activities of daily living, and it is affecting her ability to sleep for very long.  She has been given hydrocodone and she takes 1 of these at bedtime to help with her pain and she will sleep for about 4 hours before waking with pain.  Days visit she has developed a bruise on the right medial thigh, she does not recall any injury she does have swelling into the right foot, this has just been present for 2 days.    Chronic Illnesses:    Past Medical History:   Diagnosis Date    Corns and callosities      Cough     Diabetes mellitus     Foot pain     Hyperlipidemia     Hypertension     Low back pain     Osteoporosis     Peripheral venous insufficiency     Pneumonia     Thoracic disc disorder     Toe pain          Past Surgical History:   Procedure Laterality Date    BACK SURGERY  05/2013    REDDING    PACEMAKER IMPLANTATION      TUBAL ABDOMINAL LIGATION         Allergies   Allergen Reactions    Tramadol Nausea And Vomiting     Other reaction(s): Vomiting    Codeine Other (See Comments)     Other reaction(s): Other (See Comments)         Current Outpatient Medications:     aspirin 81 MG EC tablet, Take 1 tablet by mouth Daily., Disp: , Rfl:     atorvastatin (LIPITOR) 40 MG tablet, TAKE 1 TABLET BY MOUTH ONCE DAILY AT NIGHT AT BEDTIME, Disp: , Rfl:     carvedilol (COREG) 12.5 MG tablet, Take 1 tablet by mouth Every 12 (Twelve) Hours., Disp: , Rfl:     Cholecalciferol (VITAMIN D3) 5000 UNITS capsule capsule, Take 1 capsule by mouth Daily., Disp: , Rfl:     cyclobenzaprine (FLEXERIL) 10 MG tablet, Take 1 tablet by mouth 3 (Three) Times a Day As Needed for Muscle Spasms., Disp: , Rfl:     Diclofenac Sodium (VOLTAREN) 1 % gel gel, Apply 4 g topically to the appropriate area as directed 4 (Four) Times a Day As Needed (right quadricep pain)., Disp: 50 g, Rfl: 0    dicyclomine (BENTYL) 10 MG capsule, Bentyl 10 mg capsule  Four times a day, Disp: , Rfl:     furosemide (LASIX) 20 MG tablet, Take 1 tablet by mouth Daily., Disp: , Rfl:     glipizide (GLUCOTROL) 5 MG tablet, Take 2 tablets by mouth 2 (Two) Times a Day., Disp: , Rfl:     glyburide (DIAbeta) 2.5 MG tablet, glyburide 2.5 mg tablet  Daily, Disp: , Rfl:     HYDROcodone-acetaminophen (NORCO) 5-325 MG per tablet, Take 1 tablet by mouth Every 6 (Six) Hours As Needed for Moderate Pain., Disp: 10 tablet, Rfl: 0    HYDROcodone-acetaminophen (NORCO) 7.5-325 MG per tablet, Take 1 tablet by mouth every night at bedtime., Disp: , Rfl:     Lancets (onetouch ultrasoft) lancets,  USE 1 CARTRIDGE NEEDLE ONCE A DAY, Disp: , Rfl:     loratadine (CLARITIN) 10 MG tablet, Take 1 tablet by mouth Daily., Disp: , Rfl:     losartan (COZAAR) 100 MG tablet, Take 1 tablet by mouth Every Night., Disp: , Rfl:     metoprolol succinate XL (TOPROL-XL) 25 MG 24 hr tablet, Take 1 tablet by mouth Daily., Disp: , Rfl:     montelukast (SINGULAIR) 10 MG tablet, Take 1 tablet by mouth Daily As Needed., Disp: , Rfl:     Multi-Vitamin tablet tablet, Take 1 tablet by mouth Daily., Disp: , Rfl:     OneTouch Ultra test strip, USE 1 STRIP ONCE A DAY, Disp: , Rfl:     pantoprazole (PROTONIX) 40 MG EC tablet, Take 1 tablet by mouth Daily., Disp: , Rfl:     pregabalin (Lyrica) 25 MG capsule, Take 1 capsule by mouth 3 (Three) Times a Day., Disp: 30 capsule, Rfl: 0    SITagliptin-metFORMIN HCl ER (JANUMET XR) 100-1000 MG tablet, Take 1 tablet by mouth., Disp: , Rfl:     sodium polystyrene (KAYEXALATE) powder, , Disp: , Rfl:     Social History     Socioeconomic History    Marital status:    Tobacco Use    Smoking status: Never     Passive exposure: Past    Smokeless tobacco: Never   Vaping Use    Vaping status: Never Used   Substance and Sexual Activity    Alcohol use: No    Drug use: No    Sexual activity: Not Currently     Partners: Male       Family History   Problem Relation Age of Onset    Breast cancer Daughter 43    Breast cancer Maternal Aunt         DX AGE UNKNOWN    Breast cancer Maternal Aunt         DX AGE UNKNOWN    Ovarian cancer Neg Hx        BMI is within normal parameters. No other follow-up for BMI required.       Social History    Tobacco Use      Smoking status: Never        Passive exposure: Past      Smokeless tobacco: Never       Review of Systems   Constitutional:  Negative for activity change, appetite change, chills, diaphoresis, fatigue, fever and unexpected weight change.   HENT:  Negative for congestion, dental problem, drooling, ear discharge, ear pain, facial swelling, hearing loss, mouth  sores, nosebleeds, postnasal drip, rhinorrhea, sinus pressure, sinus pain, sneezing, sore throat, tinnitus, trouble swallowing and voice change.    Eyes:  Negative for photophobia, pain, discharge, redness, itching and visual disturbance.   Respiratory:  Negative for apnea, cough, choking, chest tightness, shortness of breath, wheezing and stridor.    Cardiovascular:  Negative for chest pain, palpitations and leg swelling.   Gastrointestinal:  Negative for abdominal distention, abdominal pain, anal bleeding, blood in stool, constipation, diarrhea, nausea, rectal pain and vomiting.   Endocrine: Negative for cold intolerance, heat intolerance, polydipsia, polyphagia and polyuria.   Genitourinary:  Negative for decreased urine volume, difficulty urinating, dyspareunia, dysuria, enuresis, flank pain, frequency, genital sores, hematuria, menstrual problem, pelvic pain, urgency, vaginal bleeding, vaginal discharge and vaginal pain.   Musculoskeletal:  Positive for arthralgias, back pain, gait problem and joint swelling. Negative for myalgias, neck pain and neck stiffness.   Skin:  Negative for color change, pallor, rash and wound.   Allergic/Immunologic: Negative for environmental allergies, food allergies and immunocompromised state.   Neurological:  Negative for dizziness, tremors, seizures, syncope, facial asymmetry, speech difficulty, weakness, light-headedness, numbness and headaches.   Hematological:  Negative for adenopathy. Does not bruise/bleed easily.   Psychiatric/Behavioral:  Negative for agitation, behavioral problems, confusion, decreased concentration, dysphoric mood, hallucinations, self-injury, sleep disturbance and suicidal ideas. The patient is not nervous/anxious and is not hyperactive.         Gait & Balance Assessment:  Risk assessment for falls. Fall precautions:  such as;   Using gait aids a cane, walker at the appropriate height at all times for ambulation or if necessary a wheelchair  Removing  "all area rugs and coffee tables to create a safe environment at home  Ensure clean, dry floors  Wearing supportive footwear and properly fitting clothing  Ensure bed/chair is appropriate height and patient's feet can touch the floor  Using a shower transfer bench  Using walk-in shower and having shower safety bars installed  Ensure proper lighting, minimize glare  Have nightlights operational and in use  Participation in an exercise program for gait training, balance training and strength  Avoid carrying laundry up and down steps  Ensure proper compliance and organization of medications to avoid errors   Avoid use of over the counter sedatives and alcohol consumption  Ensure easy access to call bell, glasses, TV control, telephone  Ensure glasses/hearing aids are in use or close by (on top of night table)     Physical examination:  Temperature 98.6 °F (37 °C), temperature source Temporal, height 149.9 cm (59\"), weight 49.9 kg (110 lb).  HEENT- normocephalic, atraumatic, sclera clear  Lungs-normal expansion, no wheezing  Heart-regular rate and rhythm  Extremities-positive pulses, no edema    Neurological Exam   [id number]  A/A/C, speech clear, attention normal, conversant, answers questions appropriately, good historian.  Cranial nerves II through XII are intact.  Motor evaluation does not reveal weakness in the lower extremities.   Straight leg raising causes more right-sided low back pain buttock pain.  Internal and external rotation of the hip causes right-sided hip pain and some groin pain.  Sensation is intact.  Gait is normal however she does lean forward in a flexed forward position walking, balance is normal with her rolling walker.   No tremors are noted.      Radiographic Imaging:  For my review are CT scans, she has pacer/defib therefore cannot have an MRI scan there is minimal changes at the superior endplate of T11. Signicant degenerative disc and facet arthropathy is noted throughout the L/S.    Narrowing " and bone spurring of both hip joints and the right greater than left SI joints along with dextroscoliosis and suggestion of an L4 kyphoplasty.  Medical Decision Making  Diagnoses and all orders for this visit:    1. Injury of musculoskeletal system (Primary)    2. Acute right hip pain    3. Acute pain of right thigh    Today's visit was mostly because of the new onset of right-sided low back pain, hip pain that radiates into the groin and right thigh pain that will extend just below the knee.  Internal and external rotation of the hip, Nito's exam did reveal some right hip and groin pain.  Straight leg raising does not cause radicular pain.  She has developed a new bruise on the right medial thigh, she does not remember any particular movements and there has not been a new fall to cause the bruising and she also developed swelling in the right ankle.  I have discussed this patient with Dr. Waters and he has agreed to see her today.  I am not currently finding a neurosurgical reason for this pain.    She will follow-up with me end of June with x-ray of the thoracic spine.    Any copied data from previous notes included in the (1) HPI, (2) PE, (3) MDM and/or assessment and plan has been reviewed and is accurate as of this day.    Catina Cotton, PAC    Patient Care Team:  Steve Cortes MD as PCP - General (Family Medicine)  Alem Amado MD as Consulting Physician (Orthopedic Surgery)

## 2025-05-27 DIAGNOSIS — M48.061 SPINAL STENOSIS OF LUMBAR REGION, UNSPECIFIED WHETHER NEUROGENIC CLAUDICATION PRESENT: ICD-10-CM

## 2025-05-27 DIAGNOSIS — M54.16 LUMBAR RADICULOPATHY: ICD-10-CM

## 2025-05-27 RX ORDER — PREGABALIN 25 MG/1
25 CAPSULE ORAL 3 TIMES DAILY
Qty: 30 CAPSULE | Refills: 0 | Status: SHIPPED | OUTPATIENT
Start: 2025-05-27

## 2025-05-27 NOTE — TELEPHONE ENCOUNTER
Provider:  Lula  Surgery/Procedure:  petar  Surgery/Procedure Date:  na  Last visit:  Office Visit with Jaelyn Cotton PA-C (05/21/2025)    Next visit:      Reason for call: Alyson called and stated that she is out of her Lyrica. The daughter said that she was taking the Lyrica ok.  Could she have a refill called in. Pended if approved.  Please Advise. Thank you.    MICHELLE: 2 05/16/2025 [id number] Hydrocodone Bitartrate/Ac   325MG/5MG  [facility]  10.00 2 25 03 [facility] 05/16/2025 TABS [facility]  1 05/16/2025 [id number] Pregabalin 25MG Jaelyn Cotton [place] 30.00 10 03 [place]  New 05/16/2025 [facility]  2 05/05/2025 [id number] Hydrocodone Bitartrate/Ac   325MG/7.5MG  Steve Cortes [facility]  20.00 20 03 [place]  New 05/05/2025 TABS [facility]  2 04/02/2025 [id number] Hydrocodone Bitartrate/Ac   325MG/7.5MG  Steve Cortes [facility]  20.00 20 03 [place]    Requested Prescriptions     Pending Prescriptions Disp Refills    pregabalin (Lyrica) 25 MG capsule 30 capsule 0     Sig: Take 1 capsule by mouth 3 (Three) Times a Day.

## 2025-06-04 ENCOUNTER — OFFICE VISIT (OUTPATIENT)
Age: OVER 89
End: 2025-06-04
Payer: MEDICARE

## 2025-06-04 VITALS
WEIGHT: 160 LBS | SYSTOLIC BLOOD PRESSURE: 115 MMHG | BODY MASS INDEX: 34.52 KG/M2 | HEIGHT: 57 IN | DIASTOLIC BLOOD PRESSURE: 55 MMHG

## 2025-06-04 DIAGNOSIS — M16.11 PRIMARY OSTEOARTHRITIS OF RIGHT HIP: ICD-10-CM
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
## 2025-06-04 NOTE — PROGRESS NOTES
[facility] [place] [facility] Office Visit     Office Visit       Date: 06/04/2025   Patient Name: Laverne Cole  MRN: [id number]  YOB: 1933    Referring Physician: Zev Waters MD     Chief Complaint:   Chief Complaint   Patient presents with    Right Hip - Pain       History of Present Illness:   Laverne Cole is a 89 y.o. female who presents with right hip pain for 4.5 week(s). Onset stepping out of SUV. Pain is localized to is radiating down leg and front of thigh and is a 0/10 on the pain scale now, was a 10/10.Pain is described as aching. Associated symptoms include pain and discoloration of thigh.  The pain is worse with sleeping; assistive device (cane/walker) and moving improve the pain. Previous treatments have included: cane/walker since symptom onset. Although some transient relief was reported with these interventions, these conservative measures have failed and symptoms have persisted. The patient is limited in daily activities and has had a significant decrease in quality of life as a result. She denies fevers, chills, or constitutional symptoms.    Subjective   Review of Systems: Review of Systems   Constitutional:  Negative for chills, fever, unexpected weight gain and unexpected weight loss.   HENT:  Negative for congestion, postnasal drip and rhinorrhea.    Eyes:  Negative for blurred vision.   Respiratory:  Negative for shortness of breath.    Cardiovascular:  Negative for leg swelling.   Gastrointestinal:  Negative for abdominal pain, nausea and vomiting.   Genitourinary:  Negative for difficulty urinating.   Musculoskeletal:  Positive for arthralgias. Negative for gait problem, joint swelling and myalgias.   Skin:  Negative for skin lesions and wound.   Neurological:  Negative for dizziness, weakness, light-headedness and numbness.   Hematological:  Does not bruise/bleed easily.   Psychiatric/Behavioral:  Negative for depressed mood.    All  other systems reviewed and are negative.       I have reviewed the following portions of the patient's history:History of Present Illness and review of systems.    Past Medical History:   Past Medical History:   Diagnosis Date    Corns and callosities     Cough     Diabetes mellitus     Foot pain     Hyperlipidemia     Hypertension     Low back pain     Osteoporosis     Peripheral venous insufficiency     Pneumonia     Thoracic disc disorder     Toe pain        Past Surgical History:   Past Surgical History:   Procedure Laterality Date    BACK SURGERY  05/2013    REDDING    CORONARY ANGIOPLASTY WITH STENT PLACEMENT  2016    PACEMAKER IMPLANTATION      TUBAL ABDOMINAL LIGATION         Family History:   Family History   Problem Relation Age of Onset    Breast cancer Daughter 43    Breast cancer Maternal Aunt         DX AGE UNKNOWN    Breast cancer Maternal Aunt         DX AGE UNKNOWN    Ovarian cancer Neg Hx        Social History:   Social History     Socioeconomic History    Marital status:    Tobacco Use    Smoking status: Never     Passive exposure: Past    Smokeless tobacco: Never   Vaping Use    Vaping status: Never Used   Substance and Sexual Activity    Alcohol use: No    Drug use: No    Sexual activity: Not Currently     Partners: Male       Medications:   Current Outpatient Medications:     aspirin 81 MG EC tablet, Take 1 tablet by mouth Daily., Disp: , Rfl:     atorvastatin (LIPITOR) 40 MG tablet, TAKE 1 TABLET BY MOUTH ONCE DAILY AT NIGHT AT BEDTIME, Disp: , Rfl:     carvedilol (COREG) 12.5 MG tablet, Take 1 tablet by mouth Every 12 (Twelve) Hours., Disp: , Rfl:     Cholecalciferol (VITAMIN D3) 5000 UNITS capsule capsule, Take 1 capsule by mouth Daily., Disp: , Rfl:     cyclobenzaprine (FLEXERIL) 10 MG tablet, Take 1 tablet by mouth 3 (Three) Times a Day As Needed for Muscle Spasms., Disp: , Rfl:     Diclofenac Sodium (VOLTAREN) 1 % gel gel, Apply 4 g topically to the appropriate area as directed 4  (Four) Times a Day As Needed (right quadricep pain)., Disp: 50 g, Rfl: 0    dicyclomine (BENTYL) 10 MG capsule, Bentyl 10 mg capsule  Four times a day, Disp: , Rfl:     furosemide (LASIX) 20 MG tablet, Take 1 tablet by mouth Daily., Disp: , Rfl:     glipizide (GLUCOTROL) 5 MG tablet, Take 2 tablets by mouth 2 (Two) Times a Day., Disp: , Rfl:     glyburide (DIAbeta) 2.5 MG tablet, glyburide 2.5 mg tablet  Daily, Disp: , Rfl:     Lancets (onetouch ultrasoft) lancets, USE 1 CARTRIDGE NEEDLE ONCE A DAY, Disp: , Rfl:     loratadine (CLARITIN) 10 MG tablet, Take 1 tablet by mouth Daily., Disp: , Rfl:     losartan (COZAAR) 100 MG tablet, Take 1 tablet by mouth Every Night., Disp: , Rfl:     metoprolol succinate XL (TOPROL-XL) 25 MG 24 hr tablet, Take 1 tablet by mouth Daily., Disp: , Rfl:     montelukast (SINGULAIR) 10 MG tablet, Take 1 tablet by mouth Daily As Needed., Disp: , Rfl:     Multi-Vitamin tablet tablet, Take 1 tablet by mouth Daily., Disp: , Rfl:     OneTouch Ultra test strip, USE 1 STRIP ONCE A DAY, Disp: , Rfl:     pantoprazole (PROTONIX) 40 MG EC tablet, Take 1 tablet by mouth Daily., Disp: , Rfl:     pregabalin (Lyrica) 25 MG capsule, Take 1 capsule by mouth 3 (Three) Times a Day., Disp: 30 capsule, Rfl: 0    SITagliptin-metFORMIN HCl ER (JANUMET XR) 100-1000 MG tablet, Take 1 tablet by mouth., Disp: , Rfl:     sodium polystyrene (KAYEXALATE) powder, , Disp: , Rfl:     HYDROcodone-acetaminophen (NORCO) 7.5-325 MG per tablet, Take 1 tablet by mouth 2 (Two) Times a Day. (Patient not taking: Reported on 6/4/2025), Disp: 60 tablet, Rfl: 0    Allergies:   Allergies   Allergen Reactions    Tramadol Nausea And Vomiting     Other reaction(s): Vomiting    Codeine Other (See Comments)     Other reaction(s): Other (See Comments)       I reviewed the patient's chief complaint, history of present illness, review of systems, past medical history, surgical history, family history, social history, medications and allergy  "list.     Objective    Vital Signs:   Vitals:    06/04/25 1454   BP: 115/55   Weight: 72.6 kg (160 lb)   Height: 144.8 cm (57\")     Body mass index is 34.62 kg/m².   BMI is within normal parameters. No other follow-up for BMI required.     Patient reports that she is a non-smoker and has not ever been a smoker.  This behavior was applauded and she was encouraged to continue in smoking cessation.  We will continue to monitor at subsequent visits.    Ortho Exam:  Cardiovascular System: Arterial Pulses Right: dorsalis pedis pulse normal. Varicosities Right: capillary refill test normal.   Lumbar Spine: Inspection: normal alignment.   Hip/Pelvis Appearance: Inspection: normal axial alignment.   Hips: Bony Palpation Right: no tenderness of the greater trochanter. Soft Tissue Palpation Right: tenderness of the hip flexor muscles. Active Range of Motion Right: limited (secondary to pain especially flexion and rotation). Strength Right: normal 5/5.   Skin: Right Lower Extremity: normal. Left Lower Extremity: normal.   Neurologic: Sensation on the Right: L1 normal, L2 normal, L3 normal, L4 normal, and S2 normal. Sensation on the Left: L1 normal, L2 normal, L3 normal, L4 normal, and S2 normal.    Results Review:   Imaging Results (Last 24 Hours)       ** No results found for the last 24 hours. **        I personally reviewed and interpreted radiographs of the right hip from 5/5/2025.  No acute fracture or dislocation.  Degenerative changes are noted in the hip joints.    Procedures    Assessment / Plan    Assessment/Plan:   Diagnoses and all orders for this visit:    1. Fall from slip, trip, or stumble, initial encounter (Primary)    2. Primary osteoarthritis of right hip    Plan:  Recent fall aggravated with back and hip pain.  She notes anterior hip and groin pain that limited her ability to walk.  Radiographs show degenerative changes in the hip joint.  Symptoms have resolved over the last several days.  She denies any " significant difficulty with walking.  Does use a walker.  Pain not reproducible on physical exam.  Recommend continue with management of low back injury.  Once cleared, she can return to walking as desired.  Intense pain that limits her ability to walk should prompt same-day visit.  Otherwise follow-up as needed.    Previous studies reviewed: Radiographs right hip 5/5/2025.  CMP 6-23. Office visit 5/21/2025.    Follow Up:   Return if symptoms worsen or fail to improve.      Haroon Boucher MD  [facility]

## 2025-06-09 ENCOUNTER — TELEPHONE (OUTPATIENT)
Dept: NEUROSURGERY | Facility: CLINIC | Age: OVER 89
End: 2025-06-09

## 2025-06-09 NOTE — TELEPHONE ENCOUNTER
Caller: PAYTON BRIONES    Relationship: DAUGHTER    Best call back number:[phone]    What is the best time to reach you: ANYTIME    Who are you requesting to speak with (clinical staff, provider,  specific staff member): ANDRES    Do you know the name of the person who called:     What was the call regarding: PATIENTS DAUGHTER CALLED AND WAS ASKING FOR A CONTACT NUMBER FOR ANDRES-I ADVISED I DID NOT HAVE THAT NUMBER BUT WOULD SEND A MESSAGE TO OFFICE TO ADVISE-PATIENT DID NOT HAVE A LAST NAME OF THE ANDRES SHE WAS REFERRING TO-SENDING TO OFFICE TO ADVISE OF CALL FROM DAUGHTER-THEY ARE REQUESTING A CALL BACK ASAP THANK YOU    Is it okay if the provider responds through [employer]:

## 2025-06-10 DIAGNOSIS — M48.061 SPINAL STENOSIS OF LUMBAR REGION, UNSPECIFIED WHETHER NEUROGENIC CLAUDICATION PRESENT: Primary | ICD-10-CM

## 2025-06-10 DIAGNOSIS — M54.16 LUMBAR RADICULOPATHY: ICD-10-CM

## 2025-06-10 RX ORDER — PREGABALIN 25 MG/1
25 CAPSULE ORAL 3 TIMES DAILY
Qty: 30 CAPSULE | Refills: 0 | Status: SHIPPED | OUTPATIENT
Start: 2025-06-10

## 2025-06-10 NOTE — TELEPHONE ENCOUNTER
Provider:  Lula  Surgery/Procedure:  petar  Surgery/Procedure Date:  na  Last visit:   Office Visit with Jaelyn Cotton PA-C (05/21/2025)   Next visit: TBD     Reason for call:  Alyson the pati daughter called and stated that her mother was almost out of her Lyrica. She is requesting a refill.Please Advise. Thank you.    MICHELLE:  2 05/27/2025 [id number] Pregabalin 25MG [facility]  30.00 10 03 [facility] 05/27/2025 [facility]  2 05/25/2025 [id number] Hydrocodone Bitartrate/Ac   325MG/7.5MG  [facility]  60.00 30 03 [facility]  2 05/16/2025 [id number] Hydrocodone Bitartrate/Ac   325MG/5MG  [facility]  10.00 2 25 03 [facility] 05/16/2025 [facility]  1 05/16/2025 [id number] Pregabalin 25MG Jaelyn Cotton [place] 30.00 10 03 [place]    Requested Prescriptions     Pending Prescriptions Disp Refills    pregabalin (Lyrica) 25 MG capsule 30 capsule 0     Sig: Take 1 capsule by mouth 3 (Three) Times a Day.

## 2025-06-10 NOTE — TELEPHONE ENCOUNTER
She had stated earlier that she was, when I called back the daughter stated that her mom was having some edema in her feet and midway up her legs.  She did say that her pain was ok and that she was doing ok with the medication.  She said her mom said that her hands were even better.

## 2025-06-13 NOTE — TELEPHONE ENCOUNTER
Provider:  Catina Cotton   Surgery/Procedure:  n/a  Surgery/Procedure Date:  n/a  Last visit:   05/21/2025  Next visit: n/a      Reason for call:  Alyson Cole (patient's daughter) called concerned about sides effects with Lyrica. Laverne has been having swelling in her legs for the last 4 days, her daughter stated that this was something new and started all of sudden. Patient has not taken Lyrica since Wednesday and the swelling has gone down and her RT leg pain has not gotten any worse. So patient is just wanting to know if she needs to stay on Lyrica? If this would be causing the swelling? Daughter does not feel comfortable giving her anymore until she speaks with Catina. Laverne is also a heart patient and has seen her cardiologist recently, I told her if swelling becomes worse or if she has shortness of breath, she needs to be seen at the ER.   Alyson Cole #[phone]

## 2025-06-13 NOTE — TELEPHONE ENCOUNTER
Returned Alyson's call and was leaving a message to return call when I was disconnected. Will try to contact Alyson again before the end of the day.

## 2025-06-13 NOTE — TELEPHONE ENCOUNTER
DAUGHTER PAYTON CALLED AGAIN WANTING TO SPEAK TO ANDRES ABOUT THE MEDICATION RECENTLY PRESCRIBED TO HER MOTHER. PLEASE CALL HER BACK [phone]    THANK YOU,

## 2025-06-18 ENCOUNTER — OFFICE VISIT (OUTPATIENT)
Dept: PAIN MEDICINE | Facility: CLINIC | Age: OVER 89
End: 2025-06-18
Payer: MEDICARE

## 2025-06-18 VITALS — BODY MASS INDEX: 23.51 KG/M2 | HEIGHT: 57 IN | WEIGHT: 109 LBS

## 2025-06-18 DIAGNOSIS — M47.817 LUMBOSACRAL SPONDYLOSIS WITHOUT MYELOPATHY: ICD-10-CM

## 2025-06-18 DIAGNOSIS — S76.011A HIP STRAIN, RIGHT, INITIAL ENCOUNTER: Primary | ICD-10-CM

## 2025-06-18 DIAGNOSIS — G89.4 CHRONIC PAIN SYNDROME: ICD-10-CM

## 2025-06-18 NOTE — PROGRESS NOTES
Referring Physician: No referring provider defined for this encounter.    Primary Physician: Steve Cortes MD    CHIEF COMPLAINT or REASON FOR VISIT: Follow-up and Hip Pain      Initial history of present illness on 05/21/2025:  Ms. Laverne Cole is 89 y.o. female who presents as a new patient referral for evaluation and treatment of a 2-week history of right thigh pain.  Patient has had recent difficulties with a fall in March subsequent which she had been complaining of back pain.  She has seen neurosurgery for concern regarding a possible new T1 compression fracture.  She is currently wearing a TLSO and is not significantly complaining of back pain.  Unfortunately 2 weeks ago on approximately 5/5/2025 she developed new onset right medial thigh pain exacerbated with thigh flexion.  She has bruising on her inner thigh and swelling of the right foot which is new.  She does not recall a specific antecedent trauma.  She denies any numbness or tingling.  She does have weakness with thigh flexion.  She has been evaluated by Catina Cotton PA-C who referred for further evaluation.     Interval history:  Patient returns to clinic today.  She was prescribed hydrocodone 7.5 mg, 60 pills, at her last office visit for acute right thigh pain.  Additionally, she was taking pregabalin 25 mg.  She is no longer taking hydrocodone or pregabalin.  She stopped taking pregabalin due to lower extremity swelling.  Since stopping this medication the lower extremity edema has improved dramatically.  Some lower extremity pitting edema remains but this is significantly improved.  She denies any chest pain, shortness of breath, unilateral leg swelling, or leg pain.  Additionally, she she felt hydrocodone caused nausea and vomiting and stopped taking this medication shortly after starting it.  She has been without this medication for 3 weeks..  Nonetheless, patient reports excellent relief of her pain.  She is not complaining of any  back or right neck pain today.  Overall, her symptoms dramatically improved and she has no new injuries or issues at this time.    Interventions:      Objective Pain Scoring:   BRIEF PAIN INVENTORY:  Total score:   Pain Score    06/18/25 1229   PainSc: 0-No pain        PHQ-2: 0  PHQ-9:    Opioid Risk Tool:         Review of Systems:   ROS negative except as otherwise noted     Past Medical History:   Past Medical History:   Diagnosis Date    Corns and callosities     Cough     Diabetes mellitus     Foot pain     Hyperlipidemia     Hypertension     Low back pain     Osteoporosis     Peripheral venous insufficiency     Pneumonia     Thoracic disc disorder     Toe pain          Past Surgical History:   Past Surgical History:   Procedure Laterality Date    BACK SURGERY  05/2013    REDDING    CORONARY ANGIOPLASTY WITH STENT PLACEMENT  2016    PACEMAKER IMPLANTATION      TUBAL ABDOMINAL LIGATION           Family History   Family History   Problem Relation Age of Onset    Breast cancer Daughter 43    Breast cancer Maternal Aunt         DX AGE UNKNOWN    Breast cancer Maternal Aunt         DX AGE UNKNOWN    Ovarian cancer Neg Hx          Social History   Social History     Socioeconomic History    Marital status:    Tobacco Use    Smoking status: Never     Passive exposure: Past    Smokeless tobacco: Never   Vaping Use    Vaping status: Never Used   Substance and Sexual Activity    Alcohol use: No    Drug use: No    Sexual activity: Not Currently     Partners: Male        Medications:     Current Outpatient Medications:     aspirin 81 MG EC tablet, Take 1 tablet by mouth Daily., Disp: , Rfl:     atorvastatin (LIPITOR) 40 MG tablet, TAKE 1 TABLET BY MOUTH ONCE DAILY AT NIGHT AT BEDTIME, Disp: , Rfl:     carvedilol (COREG) 12.5 MG tablet, Take 1 tablet by mouth Every 12 (Twelve) Hours., Disp: , Rfl:     Cholecalciferol (VITAMIN D3) 5000 UNITS capsule capsule, Take 1 capsule by mouth Daily., Disp: , Rfl:      "cyclobenzaprine (FLEXERIL) 10 MG tablet, Take 1 tablet by mouth 3 (Three) Times a Day As Needed for Muscle Spasms., Disp: , Rfl:     Diclofenac Sodium (VOLTAREN) 1 % gel gel, Apply 4 g topically to the appropriate area as directed 4 (Four) Times a Day As Needed (right quadricep pain)., Disp: 50 g, Rfl: 0    dicyclomine (BENTYL) 10 MG capsule, Bentyl 10 mg capsule  Four times a day, Disp: , Rfl:     furosemide (LASIX) 20 MG tablet, Take 1 tablet by mouth Daily., Disp: , Rfl:     glipizide (GLUCOTROL) 5 MG tablet, Take 2 tablets by mouth 2 (Two) Times a Day., Disp: , Rfl:     glyburide (DIAbeta) 2.5 MG tablet, glyburide 2.5 mg tablet  Daily, Disp: , Rfl:     Lancets (onetouch ultrasoft) lancets, USE 1 CARTRIDGE NEEDLE ONCE A DAY, Disp: , Rfl:     loratadine (CLARITIN) 10 MG tablet, Take 1 tablet by mouth Daily., Disp: , Rfl:     losartan (COZAAR) 100 MG tablet, Take 1 tablet by mouth Every Night., Disp: , Rfl:     metoprolol succinate XL (TOPROL-XL) 25 MG 24 hr tablet, Take 1 tablet by mouth Daily., Disp: , Rfl:     montelukast (SINGULAIR) 10 MG tablet, Take 1 tablet by mouth Daily As Needed., Disp: , Rfl:     Multi-Vitamin tablet tablet, Take 1 tablet by mouth Daily., Disp: , Rfl:     OneTouch Ultra test strip, USE 1 STRIP ONCE A DAY, Disp: , Rfl:     pantoprazole (PROTONIX) 40 MG EC tablet, Take 1 tablet by mouth Daily., Disp: , Rfl:     pregabalin (Lyrica) 25 MG capsule, Take 1 capsule by mouth 3 (Three) Times a Day., Disp: 30 capsule, Rfl: 0    SITagliptin-metFORMIN HCl ER (JANUMET XR) 100-1000 MG tablet, Take 1 tablet by mouth., Disp: , Rfl:     sodium polystyrene (KAYEXALATE) powder, , Disp: , Rfl:     HYDROcodone-acetaminophen (NORCO) 7.5-325 MG per tablet, Take 1 tablet by mouth 2 (Two) Times a Day. (Patient not taking: Reported on 6/18/2025), Disp: 60 tablet, Rfl: 0        Physical Exam:     Vitals:    06/18/25 1229   Weight: 49.4 kg (109 lb)   Height: 144.8 cm (57.01\")   PainSc: 0-No pain          General: " Alert and oriented, No acute distress.   HEENT: Normocephalic, atraumatic.   Cardiovascular: 2+ pitting edema in the right and left foot  Respiratory: Respirations are non-labored  Patient denies any new onset unilateral leg swelling, chest pain, shortness of breath.  Motor Exam:    Strength: Rate on 1-5 scale Right Left    L1/2- hip flexion 3/5  5/5    L3- knee extension 4/5  5/5    L4- ankle dorsiflexion 5/5  5/5    L5- great toe extension 5/5  5/5    S1- ankle plantarflexion 5/5  5/5    Sensory Exam: Full and equal sensation to light touch throughout.       Neurologic: Cranial Nerves II-XII are grossly intact.     Psychiatric: Cooperative.   Gait: rolling walker   Assistive Devices: antalgic    Rigid back brace donned today    Imaging Studies:   No results found for this or any previous visit.        Independent review of radiographic imaging:     Impression & Plan:       05/21/2025: Laverne Cole is a 89 y.o. female with past medical history significant for osteoporosis, HTN, HLD, diabetes, chronic shoulder pain, T1 compression fracture, L4 compression fracture who presents to the pain clinic for evaluation and treatment of right thigh pain.  Evaluation is consistent with musculoskeletal strain/injury of right hip flexors.  My expectation is that she improves with time; we will palliate with Norco in the meantime and have her see orthopedics with Dr. Boucher.  6/18/2025: Excellent relief of pain without hydrocodone or pregabalin.  No new injuries or events.    1. Hip strain, right, initial encounter    2. Lumbosacral spondylosis without myelopathy    3. Chronic pain syndrome          PLAN:  1. Medications:    - Discontinue Norco 7.5 mg; patient no longer taking this medication      2. Physical Therapy:     3. Psychological: defer    4. Complementary and alternative (CAM) Therapies:     5. Labs/Diagnostic studies: None indicated     6. Imaging    7. Interventions: None indicated     8. Referrals:   9. Records  neurosurgery notes reviewed, orthopedic notes reviewed, personally discussed this patient with neurosurgery, Catina Cotton PA-C    10. Lifestyle goals:    Follow-up as needed      [facility]  Gerald Faulkner PA-C          Quality Metrics:                Please note that portions of this note were completed with a voice recognition program.      Any copied data in any portion of my note from previous notes included in the HPI, PE, MDM and/or assessment and plan has been reviewed by myself and accurate as of this date.      The 21st Century Cures Act makes medical notes like this available to patients in the interest of transparency. This is a medical document intended as peer to peer communication. It is written in medical language and may contain abbreviations or verbiage that are unfamiliar. It may appear blunt or direct. Medical documents are intended to carry relevant information, facts as evident, and the clinical opinion of the practitioner.

## 2025-06-20 DIAGNOSIS — S22.080D COMPRESSION FRACTURE OF T11 VERTEBRA WITH ROUTINE HEALING, SUBSEQUENT ENCOUNTER: Primary | ICD-10-CM

## 2025-06-30 ENCOUNTER — OFFICE VISIT (OUTPATIENT)
Dept: NEUROSURGERY | Facility: CLINIC | Age: OVER 89
End: 2025-06-30
Payer: MEDICARE

## 2025-06-30 ENCOUNTER — HOSPITAL ENCOUNTER (OUTPATIENT)
Dept: GENERAL RADIOLOGY | Facility: HOSPITAL | Age: OVER 89
Discharge: HOME | End: 2025-06-30
Admitting: PHYSICIAN ASSISTANT
Payer: MEDICARE

## 2025-06-30 VITALS — TEMPERATURE: 97.8 F | BODY MASS INDEX: 23.84 KG/M2 | WEIGHT: 110.5 LBS | HEIGHT: 57 IN

## 2025-06-30 DIAGNOSIS — M25.551 ACUTE RIGHT HIP PAIN: ICD-10-CM

## 2025-06-30 DIAGNOSIS — S22.080D COMPRESSION FRACTURE OF T11 VERTEBRA WITH ROUTINE HEALING, SUBSEQUENT ENCOUNTER: ICD-10-CM

## 2025-06-30 DIAGNOSIS — T14.8XXA INJURY OF MUSCULOSKELETAL SYSTEM: ICD-10-CM

## 2025-06-30 DIAGNOSIS — S22.080D COMPRESSION FRACTURE OF T11 VERTEBRA WITH ROUTINE HEALING, SUBSEQUENT ENCOUNTER: Primary | ICD-10-CM

## 2025-06-30 PROCEDURE — 99213 OFFICE O/P EST LOW 20 MIN: CPT

## 2025-06-30 PROCEDURE — 72070 X-RAY EXAM THORAC SPINE 2VWS: CPT

## 2025-06-30 NOTE — PROGRESS NOTES
Patient: Laverne oCle  : 1933  Chart #: [id number]    Date of Service: 2025    Chief Complaint: Back pain, right hip, groin, and thigh pain    HPI: Ms Cole is a 89 yo female who had a fall in her garage on 3/20/25 and had multiple lacerations, contusions and a nasal fx. A few days after her fall she started having back pain and her daughter, the nurse, was concerned of a compression fx. She was evaluated in the ED 25, CT concerning for T11 superior endplate fx. She was fitted for a T/L brace which is helpful with her pain.  She was doing well until the first week of May. 2-3 days after getting in her daughter's SUV, she developed right hip pain.  Pain radiated into the right lower back to the buttock into the groin, she does endorse pain to just below the knee.  She was evaluated in the ER and underwent repeat CT scan of the lumbar spine, x-rays of the hips, CT pelvis, CT hips that demonstrated osteoarthritis. Dr. Waters evaluated her on  and recommended twice daily Norco 7.5. The patient performed this regimen for about 5 days and found that her leg pain was much improved. She then had a bout of nausea/vomiting that was felt to be from food poisoning and has not taken pain medicine since then. Additionally, she stopped Lyrica as she had some bilateral leg swelling. At her time of visit today, she has no further leg pain, back pain. She has thoracic xrays for my review.     Chronic Illnesses:    Past Medical History:   Diagnosis Date    Corns and callosities     Cough     Diabetes mellitus     Foot pain     Hyperlipidemia     Hypertension     Low back pain     Osteoporosis     Peripheral venous insufficiency     Pneumonia     Thoracic disc disorder     Toe pain          Past Surgical History:   Procedure Laterality Date    BACK SURGERY  2013    VAHID    CORONARY ANGIOPLASTY WITH STENT PLACEMENT  2016    PACEMAKER IMPLANTATION      TUBAL ABDOMINAL LIGATION         Allergies   Allergen  Reactions    Tramadol Nausea And Vomiting     Other reaction(s): Vomiting    Codeine Other (See Comments)     Other reaction(s): Other (See Comments)         Current Outpatient Medications:     aspirin 81 MG EC tablet, Take 1 tablet by mouth Daily., Disp: , Rfl:     atorvastatin (LIPITOR) 40 MG tablet, TAKE 1 TABLET BY MOUTH ONCE DAILY AT NIGHT AT BEDTIME, Disp: , Rfl:     carvedilol (COREG) 12.5 MG tablet, Take 1 tablet by mouth Every 12 (Twelve) Hours., Disp: , Rfl:     Cholecalciferol (VITAMIN D3) 5000 UNITS capsule capsule, Take 1 capsule by mouth Daily., Disp: , Rfl:     cyclobenzaprine (FLEXERIL) 10 MG tablet, Take 1 tablet by mouth 3 (Three) Times a Day As Needed for Muscle Spasms., Disp: , Rfl:     Diclofenac Sodium (VOLTAREN) 1 % gel gel, Apply 4 g topically to the appropriate area as directed 4 (Four) Times a Day As Needed (right quadricep pain)., Disp: 50 g, Rfl: 0    dicyclomine (BENTYL) 10 MG capsule, Bentyl 10 mg capsule  Four times a day, Disp: , Rfl:     furosemide (LASIX) 20 MG tablet, Take 1 tablet by mouth Daily., Disp: , Rfl:     glipizide (GLUCOTROL) 5 MG tablet, Take 2 tablets by mouth 2 (Two) Times a Day., Disp: , Rfl:     glyburide (DIAbeta) 2.5 MG tablet, glyburide 2.5 mg tablet  Daily, Disp: , Rfl:     HYDROcodone-acetaminophen (NORCO) 7.5-325 MG per tablet, Take 1 tablet by mouth 2 (Two) Times a Day. (Patient not taking: Reported on 6/18/2025), Disp: 60 tablet, Rfl: 0    Lancets (onetouch ultrasoft) lancets, USE 1 CARTRIDGE NEEDLE ONCE A DAY, Disp: , Rfl:     loratadine (CLARITIN) 10 MG tablet, Take 1 tablet by mouth Daily., Disp: , Rfl:     losartan (COZAAR) 100 MG tablet, Take 1 tablet by mouth Every Night., Disp: , Rfl:     metoprolol succinate XL (TOPROL-XL) 25 MG 24 hr tablet, Take 1 tablet by mouth Daily., Disp: , Rfl:     montelukast (SINGULAIR) 10 MG tablet, Take 1 tablet by mouth Daily As Needed., Disp: , Rfl:     Multi-Vitamin tablet tablet, Take 1 tablet by mouth Daily., Disp: ,  Rfl:     OneTouch Ultra test strip, USE 1 STRIP ONCE A DAY, Disp: , Rfl:     pantoprazole (PROTONIX) 40 MG EC tablet, Take 1 tablet by mouth Daily., Disp: , Rfl:     pregabalin (Lyrica) 25 MG capsule, Take 1 capsule by mouth 3 (Three) Times a Day., Disp: 30 capsule, Rfl: 0    SITagliptin-metFORMIN HCl ER (JANUMET XR) 100-1000 MG tablet, Take 1 tablet by mouth., Disp: , Rfl:     sodium polystyrene (KAYEXALATE) powder, , Disp: , Rfl:     Social History     Socioeconomic History    Marital status:    Tobacco Use    Smoking status: Never     Passive exposure: Past    Smokeless tobacco: Never   Vaping Use    Vaping status: Never Used   Substance and Sexual Activity    Alcohol use: No    Drug use: No    Sexual activity: Not Currently     Partners: Male       Family History   Problem Relation Age of Onset    Breast cancer Daughter 43    Breast cancer Maternal Aunt         DX AGE UNKNOWN    Breast cancer Maternal Aunt         DX AGE UNKNOWN    Ovarian cancer Neg Hx        BMI is within normal parameters. No other follow-up for BMI required.       Social History    Tobacco Use      Smoking status: Never        Passive exposure: Past      Smokeless tobacco: Never       Review of Systems   Constitutional:  Negative for activity change, appetite change, chills, diaphoresis, fatigue, fever and unexpected weight change.   HENT:  Negative for congestion, dental problem, drooling, ear discharge, ear pain, facial swelling, hearing loss, mouth sores, nosebleeds, postnasal drip, rhinorrhea, sinus pressure, sinus pain, sneezing, sore throat, tinnitus, trouble swallowing and voice change.    Eyes:  Negative for photophobia, pain, discharge, redness, itching and visual disturbance.   Respiratory:  Negative for apnea, cough, choking, chest tightness, shortness of breath, wheezing and stridor.    Cardiovascular:  Negative for chest pain, palpitations and leg swelling.   Gastrointestinal:  Negative for abdominal distention, abdominal  "pain, anal bleeding, blood in stool, constipation, diarrhea, nausea, rectal pain and vomiting.   Endocrine: Negative for cold intolerance, heat intolerance, polydipsia, polyphagia and polyuria.   Genitourinary:  Negative for decreased urine volume, difficulty urinating, dyspareunia, dysuria, enuresis, flank pain, frequency, genital sores, hematuria, menstrual problem, pelvic pain, urgency, vaginal bleeding, vaginal discharge and vaginal pain.   Musculoskeletal:  Positive for gait problem. Negative for arthralgias, back pain, joint swelling, myalgias, neck pain and neck stiffness.   Skin:  Negative for color change, pallor, rash and wound.   Allergic/Immunologic: Negative for environmental allergies, food allergies and immunocompromised state.   Neurological:  Negative for dizziness, tremors, seizures, syncope, facial asymmetry, speech difficulty, weakness, light-headedness, numbness and headaches.   Hematological:  Negative for adenopathy. Does not bruise/bleed easily.   Psychiatric/Behavioral:  Negative for agitation, behavioral problems, confusion, decreased concentration, dysphoric mood, hallucinations, self-injury, sleep disturbance and suicidal ideas. The patient is not nervous/anxious and is not hyperactive.         Physical examination:  Temperature 97.8 °F (36.6 °C), temperature source Infrared, height 144.8 cm (57.01\"), weight 50.1 kg (110 lb 8 oz).    Constitutional: The patient is well-developed, well-nourished. She is in no acute distress.     HEENT: normocephalic, atraumatic, sclera clear    MSK: Tenderness to palpation in the spine is not observed.  Straight leg testing is negative.  Nito's signs are negative.     Neurological Exam   A/A/C, speech clear, attention normal, conversant, answers questions appropriately, good historian.  Motor examination does not reveal weakness in the , upper or lower extremities.   Sensation is intact.  The patient walks with a fairly stooped gait and uses a " cane.  Balance not assessed.  No tremors are noted.  Reflexes are intact, 1+.     Radiographic Imaging:  For my review plain films of the thoracic spine dated from today demonstrate a chronic mild compression fracture of the superior endplate at T11. In comparison to thoracic scans from 4/1/2025, height loss may have slightly increased.     Medical Decision Making  Diagnoses and all orders for this visit:    1. Compression fracture of T11 vertebra with routine healing, subsequent encounter (Primary)    2. Injury of musculoskeletal system    3. Acute right hip pain    Fortunately, Ms. Cole is feeling much better in terms of her back and right lower extremity symptoms.  She has been wearing her thoracolumbar brace for nearly 3 months and has discontinued pain medications at this time.  The patient may doff her brace.  I carefully counseled both she and her daughter in the room about fall precautions, and also explained that her core muscles and the muscles that support her spine have probably weakened somewhat with the brace usage.  She is can to slowly resume some of her back exercises that she did prior to her injury.  She also has a Cubii that she uses daily for her lower extremities.  I have advised her that she can resume this, but she should build up her time with it gradually.  From a neurosurgical perspective, she can follow-up as needed.  Please do not hesitate to reach out with further questions or concerns.    Any copied data from previous notes included in the (1) HPI, (2) PE, (3) MDM and/or assessment and plan has been reviewed and is accurate as of this day.    Estefania Bhatt PA-C     Patient Care Team:  Steve Cortes MD as PCP - General (Family Medicine)  Alem Amado MD as Consulting Physician (Orthopedic Surgery)  Kwasi Boucher MD as Surgeon (Orthopedic Surgery)